# Patient Record
Sex: FEMALE | Race: ASIAN | NOT HISPANIC OR LATINO
[De-identification: names, ages, dates, MRNs, and addresses within clinical notes are randomized per-mention and may not be internally consistent; named-entity substitution may affect disease eponyms.]

---

## 2019-04-20 PROBLEM — Z00.00 ENCOUNTER FOR PREVENTIVE HEALTH EXAMINATION: Status: ACTIVE | Noted: 2019-04-20

## 2019-04-26 ENCOUNTER — ASOB RESULT (OUTPATIENT)
Age: 29
End: 2019-04-26

## 2019-04-26 ENCOUNTER — APPOINTMENT (OUTPATIENT)
Dept: ANTEPARTUM | Facility: CLINIC | Age: 29
End: 2019-04-26
Payer: COMMERCIAL

## 2019-04-26 PROCEDURE — 76801 OB US < 14 WKS SINGLE FETUS: CPT

## 2019-04-26 PROCEDURE — 76813 OB US NUCHAL MEAS 1 GEST: CPT

## 2019-04-26 PROCEDURE — 36416 COLLJ CAPILLARY BLOOD SPEC: CPT

## 2019-05-10 ENCOUNTER — TRANSCRIPTION ENCOUNTER (OUTPATIENT)
Age: 29
End: 2019-05-10

## 2019-06-13 ENCOUNTER — APPOINTMENT (OUTPATIENT)
Dept: ANTEPARTUM | Facility: CLINIC | Age: 29
End: 2019-06-13
Payer: COMMERCIAL

## 2019-06-13 PROCEDURE — 76811 OB US DETAILED SNGL FETUS: CPT

## 2019-06-13 PROCEDURE — 76817 TRANSVAGINAL US OBSTETRIC: CPT

## 2019-07-22 ENCOUNTER — NON-APPOINTMENT (OUTPATIENT)
Age: 29
End: 2019-07-22

## 2019-07-22 ENCOUNTER — APPOINTMENT (OUTPATIENT)
Dept: OBGYN | Facility: CLINIC | Age: 29
End: 2019-07-22
Payer: COMMERCIAL

## 2019-07-22 VITALS
WEIGHT: 154 LBS | SYSTOLIC BLOOD PRESSURE: 112 MMHG | HEIGHT: 64 IN | BODY MASS INDEX: 26.29 KG/M2 | DIASTOLIC BLOOD PRESSURE: 71 MMHG

## 2019-07-22 PROCEDURE — 0502F SUBSEQUENT PRENATAL CARE: CPT

## 2019-08-13 ENCOUNTER — APPOINTMENT (OUTPATIENT)
Dept: OBGYN | Facility: CLINIC | Age: 29
End: 2019-08-13
Payer: COMMERCIAL

## 2019-08-13 ENCOUNTER — NON-APPOINTMENT (OUTPATIENT)
Age: 29
End: 2019-08-13

## 2019-08-13 VITALS
BODY MASS INDEX: 26.8 KG/M2 | OXYGEN SATURATION: 98 % | DIASTOLIC BLOOD PRESSURE: 78 MMHG | SYSTOLIC BLOOD PRESSURE: 118 MMHG | WEIGHT: 157 LBS | HEIGHT: 64 IN | HEART RATE: 91 BPM

## 2019-08-13 PROCEDURE — 99213 OFFICE O/P EST LOW 20 MIN: CPT

## 2019-08-14 LAB
BASOPHILS # BLD AUTO: 0.04 K/UL
BASOPHILS NFR BLD AUTO: 0.3 %
BLD GP AB SCN SERPL QL: NORMAL
EOSINOPHIL # BLD AUTO: 0.08 K/UL
EOSINOPHIL NFR BLD AUTO: 0.7 %
GLUCOSE 1H P 50 G GLC PO SERPL-MCNC: 112 MG/DL
HBV SURFACE AG SER QL: NONREACTIVE
HCT VFR BLD CALC: 33 %
HGB BLD-MCNC: 10 G/DL
IMM GRANULOCYTES NFR BLD AUTO: 1.1 %
LYMPHOCYTES # BLD AUTO: 2.08 K/UL
LYMPHOCYTES NFR BLD AUTO: 17.4 %
MAN DIFF?: NORMAL
MCHC RBC-ENTMCNC: 27.5 PG
MCHC RBC-ENTMCNC: 30.3 GM/DL
MCV RBC AUTO: 90.9 FL
MONOCYTES # BLD AUTO: 0.72 K/UL
MONOCYTES NFR BLD AUTO: 6 %
NEUTROPHILS # BLD AUTO: 8.91 K/UL
NEUTROPHILS NFR BLD AUTO: 74.5 %
PLATELET # BLD AUTO: 332 K/UL
RBC # BLD: 3.63 M/UL
RBC # FLD: 13.4 %
WBC # FLD AUTO: 11.96 K/UL

## 2019-08-15 LAB — LEAD BLD-MCNC: <1 UG/DL

## 2019-08-27 ENCOUNTER — APPOINTMENT (OUTPATIENT)
Dept: OBGYN | Facility: CLINIC | Age: 29
End: 2019-08-27

## 2019-08-28 ENCOUNTER — APPOINTMENT (OUTPATIENT)
Dept: OBGYN | Facility: CLINIC | Age: 29
End: 2019-08-28
Payer: COMMERCIAL

## 2019-08-28 ENCOUNTER — NON-APPOINTMENT (OUTPATIENT)
Age: 29
End: 2019-08-28

## 2019-08-28 VITALS
WEIGHT: 159 LBS | BODY MASS INDEX: 27.14 KG/M2 | SYSTOLIC BLOOD PRESSURE: 125 MMHG | DIASTOLIC BLOOD PRESSURE: 79 MMHG | HEIGHT: 64 IN

## 2019-08-28 PROCEDURE — 0502F SUBSEQUENT PRENATAL CARE: CPT

## 2019-09-10 ENCOUNTER — APPOINTMENT (OUTPATIENT)
Dept: ANTEPARTUM | Facility: CLINIC | Age: 29
End: 2019-09-10
Payer: COMMERCIAL

## 2019-09-10 ENCOUNTER — ASOB RESULT (OUTPATIENT)
Age: 29
End: 2019-09-10

## 2019-09-10 ENCOUNTER — APPOINTMENT (OUTPATIENT)
Dept: OBGYN | Facility: CLINIC | Age: 29
End: 2019-09-10
Payer: COMMERCIAL

## 2019-09-10 VITALS
SYSTOLIC BLOOD PRESSURE: 110 MMHG | HEIGHT: 64 IN | DIASTOLIC BLOOD PRESSURE: 74 MMHG | WEIGHT: 157 LBS | BODY MASS INDEX: 26.8 KG/M2

## 2019-09-10 PROCEDURE — 76819 FETAL BIOPHYS PROFIL W/O NST: CPT

## 2019-09-10 PROCEDURE — 0502F SUBSEQUENT PRENATAL CARE: CPT

## 2019-09-10 PROCEDURE — 76811 OB US DETAILED SNGL FETUS: CPT

## 2019-09-25 ENCOUNTER — NON-APPOINTMENT (OUTPATIENT)
Age: 29
End: 2019-09-25

## 2019-09-25 ENCOUNTER — APPOINTMENT (OUTPATIENT)
Dept: OBGYN | Facility: CLINIC | Age: 29
End: 2019-09-25
Payer: COMMERCIAL

## 2019-09-25 VITALS
BODY MASS INDEX: 27.31 KG/M2 | SYSTOLIC BLOOD PRESSURE: 115 MMHG | DIASTOLIC BLOOD PRESSURE: 74 MMHG | HEIGHT: 64 IN | WEIGHT: 160 LBS

## 2019-09-25 PROCEDURE — 0502F SUBSEQUENT PRENATAL CARE: CPT

## 2019-10-02 ENCOUNTER — APPOINTMENT (OUTPATIENT)
Dept: OBGYN | Facility: CLINIC | Age: 29
End: 2019-10-02
Payer: COMMERCIAL

## 2019-10-02 ENCOUNTER — NON-APPOINTMENT (OUTPATIENT)
Age: 29
End: 2019-10-02

## 2019-10-02 VITALS
DIASTOLIC BLOOD PRESSURE: 76 MMHG | HEIGHT: 64 IN | SYSTOLIC BLOOD PRESSURE: 118 MMHG | WEIGHT: 160 LBS | BODY MASS INDEX: 27.31 KG/M2

## 2019-10-02 PROCEDURE — 0502F SUBSEQUENT PRENATAL CARE: CPT

## 2019-10-03 LAB
C TRACH RRNA SPEC QL NAA+PROBE: NOT DETECTED
N GONORRHOEA RRNA SPEC QL NAA+PROBE: NOT DETECTED
SOURCE AMPLIFICATION: NORMAL

## 2019-10-07 LAB — B-HEM STREP SPEC QL CULT: ABNORMAL

## 2019-10-09 ENCOUNTER — APPOINTMENT (OUTPATIENT)
Dept: OBGYN | Facility: CLINIC | Age: 29
End: 2019-10-09
Payer: COMMERCIAL

## 2019-10-09 ENCOUNTER — NON-APPOINTMENT (OUTPATIENT)
Age: 29
End: 2019-10-09

## 2019-10-09 VITALS
BODY MASS INDEX: 27.66 KG/M2 | WEIGHT: 162 LBS | HEIGHT: 64 IN | DIASTOLIC BLOOD PRESSURE: 80 MMHG | SYSTOLIC BLOOD PRESSURE: 112 MMHG

## 2019-10-09 PROCEDURE — 0502F SUBSEQUENT PRENATAL CARE: CPT

## 2019-10-16 ENCOUNTER — NON-APPOINTMENT (OUTPATIENT)
Age: 29
End: 2019-10-16

## 2019-10-16 ENCOUNTER — APPOINTMENT (OUTPATIENT)
Dept: OBGYN | Facility: CLINIC | Age: 29
End: 2019-10-16
Payer: COMMERCIAL

## 2019-10-16 VITALS
SYSTOLIC BLOOD PRESSURE: 120 MMHG | WEIGHT: 163 LBS | HEIGHT: 64 IN | DIASTOLIC BLOOD PRESSURE: 76 MMHG | BODY MASS INDEX: 27.83 KG/M2

## 2019-10-16 PROCEDURE — 0502F SUBSEQUENT PRENATAL CARE: CPT

## 2019-10-23 ENCOUNTER — APPOINTMENT (OUTPATIENT)
Dept: OBGYN | Facility: CLINIC | Age: 29
End: 2019-10-23
Payer: COMMERCIAL

## 2019-10-23 ENCOUNTER — NON-APPOINTMENT (OUTPATIENT)
Age: 29
End: 2019-10-23

## 2019-10-23 VITALS
SYSTOLIC BLOOD PRESSURE: 121 MMHG | WEIGHT: 164 LBS | DIASTOLIC BLOOD PRESSURE: 79 MMHG | HEIGHT: 64 IN | BODY MASS INDEX: 28 KG/M2

## 2019-10-23 PROCEDURE — 0502F SUBSEQUENT PRENATAL CARE: CPT

## 2019-10-25 ENCOUNTER — TRANSCRIPTION ENCOUNTER (OUTPATIENT)
Age: 29
End: 2019-10-25

## 2019-10-25 ENCOUNTER — INPATIENT (INPATIENT)
Facility: HOSPITAL | Age: 29
LOS: 3 days | Discharge: HOME | End: 2019-10-29
Attending: OBSTETRICS & GYNECOLOGY | Admitting: OBSTETRICS & GYNECOLOGY
Payer: COMMERCIAL

## 2019-10-25 ENCOUNTER — OUTPATIENT (OUTPATIENT)
Dept: OUTPATIENT SERVICES | Facility: HOSPITAL | Age: 29
LOS: 1 days | Discharge: HOME | End: 2019-10-25
Payer: COMMERCIAL

## 2019-10-25 ENCOUNTER — OUTPATIENT (OUTPATIENT)
Dept: OUTPATIENT SERVICES | Facility: HOSPITAL | Age: 29
LOS: 1 days | Discharge: HOME | End: 2019-10-25

## 2019-10-25 VITALS
TEMPERATURE: 99 F | HEART RATE: 92 BPM | OXYGEN SATURATION: 100 % | SYSTOLIC BLOOD PRESSURE: 134 MMHG | RESPIRATION RATE: 16 BRPM | DIASTOLIC BLOOD PRESSURE: 78 MMHG

## 2019-10-25 VITALS
HEART RATE: 93 BPM | RESPIRATION RATE: 18 BRPM | TEMPERATURE: 98 F | SYSTOLIC BLOOD PRESSURE: 125 MMHG | DIASTOLIC BLOOD PRESSURE: 79 MMHG

## 2019-10-25 VITALS — HEART RATE: 83 BPM | DIASTOLIC BLOOD PRESSURE: 82 MMHG | SYSTOLIC BLOOD PRESSURE: 129 MMHG

## 2019-10-25 VITALS — DIASTOLIC BLOOD PRESSURE: 77 MMHG | HEART RATE: 83 BPM | SYSTOLIC BLOOD PRESSURE: 130 MMHG | TEMPERATURE: 98 F

## 2019-10-25 VITALS
DIASTOLIC BLOOD PRESSURE: 80 MMHG | SYSTOLIC BLOOD PRESSURE: 119 MMHG | RESPIRATION RATE: 16 BRPM | HEART RATE: 106 BPM | TEMPERATURE: 99 F | OXYGEN SATURATION: 98 %

## 2019-10-25 DIAGNOSIS — Z3A.00 WEEKS OF GESTATION OF PREGNANCY NOT SPECIFIED: ICD-10-CM

## 2019-10-25 DIAGNOSIS — O03.9 COMPLETE OR UNSPECIFIED SPONTANEOUS ABORTION WITHOUT COMPLICATION: Chronic | ICD-10-CM

## 2019-10-25 DIAGNOSIS — O26.899 OTHER SPECIFIED PREGNANCY RELATED CONDITIONS, UNSPECIFIED TRIMESTER: ICD-10-CM

## 2019-10-25 DIAGNOSIS — Z98.890 OTHER SPECIFIED POSTPROCEDURAL STATES: Chronic | ICD-10-CM

## 2019-10-25 PROCEDURE — 59025 FETAL NON-STRESS TEST: CPT | Mod: 26

## 2019-10-25 PROCEDURE — 99204 OFFICE O/P NEW MOD 45 MIN: CPT

## 2019-10-25 NOTE — OB PROVIDER TRIAGE NOTE - HISTORY OF PRESENT ILLNESS
30yo  @ 39.2 wks SLIUP uncomp PNC here co ctx's Q3-5 min since 3:30am with scant brownish discharge. Pt is intact with GFM.    Pmhx-denies  Pshx/Hosp-dvc  Meds-PNV  Allergies-PCN->rash  Past ob-TOP 2017 w/ DVC  Gyn-denies

## 2019-10-25 NOTE — OB PROVIDER TRIAGE NOTE - NSOBPROVIDERNOTE_OBGYN_ALL_OB_FT
Dr. Abdul's pt. is a 28y/o EGA 39.2wks . Pt. returns to triage with increase pain with contractions. Pt. denies LOF and VB. Pt. reports FM.    AP: Denies  Medical Hx: Denies  Surgical Hx: D&C 2017  OBGYN Hx: TOPx1 2017 with D&C  Meds: PNV, Zytrec PRN for allergies  Allergies: PCN- Hives    Assessment/Plan:  SVE:/-3  NST in progress Dr. Abdul's pt. is a 30y/o EGA 39.2wks . Pt. returns to triage with increase pain with contractions. Pt. denies LOF and VB. Pt. reports FM.    AP: Denies  Medical Hx: Denies  Surgical Hx: D&C 2017  OBGYN Hx: TOPx1 2017 with D&C  Meds: PNV, Zytrec PRN for allergies  Allergies: PCN- Hives    Assessment/Plan:  SVE:/-3  FHR:130bpm, moderate variability, accels noted, no decels  Cindy every 4mins     No evidence of labor at this time. Discussed findings with Dr. Abdul. Pt. d/c'd home. Pt. to follow up with next ob appointment. Pt. instructed to return to triage with increase abdominal contractions, LOF, VB, or decrease FM.

## 2019-10-25 NOTE — OB PROVIDER TRIAGE NOTE - NSOBPROVIDERNOTE_OBGYN_ALL_OB_FT
30yo  @ 39.2 wks SLIUP uncomp PNC here in early labor 30yo  @ 39.2 wks SLIUP uncomp PNC here in early labor  -NST cat I with accels and mod variability; ctx's Q3 min  -pt was dw Dr Abdul. Pt was dc home with instructions

## 2019-10-25 NOTE — OB PROVIDER TRIAGE NOTE - HISTORY OF PRESENT ILLNESS
Dr. Abdul's pt. is a 28y/o EGA 39.2wks . Pt. returns to triage with increase pain with contractions. Pt. denies LOF and VB. Pt. reports FM.

## 2019-10-26 ENCOUNTER — RESULT REVIEW (OUTPATIENT)
Age: 29
End: 2019-10-26

## 2019-10-26 LAB
BASOPHILS # BLD AUTO: 0.05 K/UL — SIGNIFICANT CHANGE UP (ref 0–0.2)
BASOPHILS NFR BLD AUTO: 0.3 % — SIGNIFICANT CHANGE UP (ref 0–2)
BLD GP AB SCN SERPL QL: NEGATIVE — SIGNIFICANT CHANGE UP
EOSINOPHIL # BLD AUTO: 0.01 K/UL — SIGNIFICANT CHANGE UP (ref 0–0.5)
EOSINOPHIL NFR BLD AUTO: 0.1 % — SIGNIFICANT CHANGE UP (ref 0–6)
HCT VFR BLD CALC: 33.8 % — LOW (ref 34.5–45)
HGB BLD-MCNC: 10.8 G/DL — LOW (ref 11.5–15.5)
IMM GRANULOCYTES NFR BLD AUTO: 0.5 % — SIGNIFICANT CHANGE UP (ref 0–1.5)
LYMPHOCYTES # BLD AUTO: 14.1 % — SIGNIFICANT CHANGE UP (ref 13–44)
LYMPHOCYTES # BLD AUTO: 2.15 K/UL — SIGNIFICANT CHANGE UP (ref 1–3.3)
MCHC RBC-ENTMCNC: 25.6 PG — LOW (ref 27–34)
MCHC RBC-ENTMCNC: 32 % — SIGNIFICANT CHANGE UP (ref 32–36)
MCV RBC AUTO: 80.1 FL — SIGNIFICANT CHANGE UP (ref 80–100)
MONOCYTES # BLD AUTO: 1.2 K/UL — HIGH (ref 0–0.9)
MONOCYTES NFR BLD AUTO: 7.9 % — SIGNIFICANT CHANGE UP (ref 2–14)
NEUTROPHILS # BLD AUTO: 11.73 K/UL — HIGH (ref 1.8–7.4)
NEUTROPHILS NFR BLD AUTO: 77.1 % — HIGH (ref 43–77)
NRBC # FLD: 0 K/UL — SIGNIFICANT CHANGE UP (ref 0–0)
PLATELET # BLD AUTO: 338 K/UL — SIGNIFICANT CHANGE UP (ref 150–400)
PMV BLD: 9.7 FL — SIGNIFICANT CHANGE UP (ref 7–13)
RBC # BLD: 4.22 M/UL — SIGNIFICANT CHANGE UP (ref 3.8–5.2)
RBC # FLD: 14.9 % — HIGH (ref 10.3–14.5)
RH IG SCN BLD-IMP: POSITIVE — SIGNIFICANT CHANGE UP
RH IG SCN BLD-IMP: POSITIVE — SIGNIFICANT CHANGE UP
T PALLIDUM AB TITR SER: NEGATIVE — SIGNIFICANT CHANGE UP
WBC # BLD: 15.22 K/UL — HIGH (ref 3.8–10.5)
WBC # FLD AUTO: 15.22 K/UL — HIGH (ref 3.8–10.5)

## 2019-10-26 PROCEDURE — 88307 TISSUE EXAM BY PATHOLOGIST: CPT | Mod: 26

## 2019-10-26 PROCEDURE — 59514 CESAREAN DELIVERY ONLY: CPT

## 2019-10-26 RX ORDER — ONDANSETRON 8 MG/1
4 TABLET, FILM COATED ORAL ONCE
Refills: 0 | Status: DISCONTINUED | OUTPATIENT
Start: 2019-10-26 | End: 2019-10-26

## 2019-10-26 RX ORDER — VANCOMYCIN HCL 1 G
VIAL (EA) INTRAVENOUS
Refills: 0 | Status: DISCONTINUED | OUTPATIENT
Start: 2019-10-26 | End: 2019-10-26

## 2019-10-26 RX ORDER — VANCOMYCIN HCL 1 G
1000 VIAL (EA) INTRAVENOUS EVERY 12 HOURS
Refills: 0 | Status: DISCONTINUED | OUTPATIENT
Start: 2019-10-26 | End: 2019-10-26

## 2019-10-26 RX ORDER — OXYTOCIN 10 UNIT/ML
VIAL (ML) INJECTION
Qty: 20 | Refills: 0 | Status: DISCONTINUED | OUTPATIENT
Start: 2019-10-26 | End: 2019-10-26

## 2019-10-26 RX ORDER — MAGNESIUM HYDROXIDE 400 MG/1
30 TABLET, CHEWABLE ORAL
Refills: 0 | Status: DISCONTINUED | OUTPATIENT
Start: 2019-10-26 | End: 2019-10-29

## 2019-10-26 RX ORDER — SODIUM CHLORIDE 9 MG/ML
500 INJECTION INTRAMUSCULAR; INTRAVENOUS; SUBCUTANEOUS ONCE
Refills: 0 | Status: DISCONTINUED | OUTPATIENT
Start: 2019-10-26 | End: 2019-10-26

## 2019-10-26 RX ORDER — LANOLIN
1 OINTMENT (GRAM) TOPICAL EVERY 6 HOURS
Refills: 0 | Status: DISCONTINUED | OUTPATIENT
Start: 2019-10-26 | End: 2019-10-29

## 2019-10-26 RX ORDER — SODIUM CHLORIDE 9 MG/ML
1000 INJECTION, SOLUTION INTRAVENOUS
Refills: 0 | Status: DISCONTINUED | OUTPATIENT
Start: 2019-10-26 | End: 2019-10-26

## 2019-10-26 RX ORDER — HYDROMORPHONE HYDROCHLORIDE 2 MG/ML
0.5 INJECTION INTRAMUSCULAR; INTRAVENOUS; SUBCUTANEOUS
Refills: 0 | Status: DISCONTINUED | OUTPATIENT
Start: 2019-10-26 | End: 2019-10-27

## 2019-10-26 RX ORDER — OXYCODONE HYDROCHLORIDE 5 MG/1
5 TABLET ORAL
Refills: 0 | Status: DISCONTINUED | OUTPATIENT
Start: 2019-10-26 | End: 2019-10-29

## 2019-10-26 RX ORDER — TETANUS TOXOID, REDUCED DIPHTHERIA TOXOID AND ACELLULAR PERTUSSIS VACCINE, ADSORBED 5; 2.5; 8; 8; 2.5 [IU]/.5ML; [IU]/.5ML; UG/.5ML; UG/.5ML; UG/.5ML
0.5 SUSPENSION INTRAMUSCULAR ONCE
Refills: 0 | Status: DISCONTINUED | OUTPATIENT
Start: 2019-10-26 | End: 2019-10-29

## 2019-10-26 RX ORDER — OXYTOCIN 10 UNIT/ML
333.33 VIAL (ML) INJECTION
Qty: 20 | Refills: 0 | Status: DISCONTINUED | OUTPATIENT
Start: 2019-10-26 | End: 2019-10-26

## 2019-10-26 RX ORDER — NALOXONE HYDROCHLORIDE 4 MG/.1ML
0.1 SPRAY NASAL
Refills: 0 | Status: DISCONTINUED | OUTPATIENT
Start: 2019-10-26 | End: 2019-10-27

## 2019-10-26 RX ORDER — OXYCODONE HYDROCHLORIDE 5 MG/1
10 TABLET ORAL
Refills: 0 | Status: DISCONTINUED | OUTPATIENT
Start: 2019-10-26 | End: 2019-10-27

## 2019-10-26 RX ORDER — GLYCERIN ADULT
1 SUPPOSITORY, RECTAL RECTAL AT BEDTIME
Refills: 0 | Status: DISCONTINUED | OUTPATIENT
Start: 2019-10-26 | End: 2019-10-29

## 2019-10-26 RX ORDER — BUTORPHANOL TARTRATE 2 MG/ML
0.12 INJECTION, SOLUTION INTRAMUSCULAR; INTRAVENOUS EVERY 6 HOURS
Refills: 0 | Status: DISCONTINUED | OUTPATIENT
Start: 2019-10-26 | End: 2019-10-27

## 2019-10-26 RX ORDER — ACETAMINOPHEN 500 MG
975 TABLET ORAL
Refills: 0 | Status: DISCONTINUED | OUTPATIENT
Start: 2019-10-26 | End: 2019-10-29

## 2019-10-26 RX ORDER — VANCOMYCIN HCL 1 G
1000 VIAL (EA) INTRAVENOUS ONCE
Refills: 0 | Status: COMPLETED | OUTPATIENT
Start: 2019-10-26 | End: 2019-10-26

## 2019-10-26 RX ORDER — DIPHENHYDRAMINE HCL 50 MG
25 CAPSULE ORAL EVERY 6 HOURS
Refills: 0 | Status: DISCONTINUED | OUTPATIENT
Start: 2019-10-26 | End: 2019-10-29

## 2019-10-26 RX ORDER — OXYCODONE HYDROCHLORIDE 5 MG/1
5 TABLET ORAL ONCE
Refills: 0 | Status: DISCONTINUED | OUTPATIENT
Start: 2019-10-26 | End: 2019-10-29

## 2019-10-26 RX ORDER — OXYCODONE HYDROCHLORIDE 5 MG/1
5 TABLET ORAL
Refills: 0 | Status: DISCONTINUED | OUTPATIENT
Start: 2019-10-26 | End: 2019-10-27

## 2019-10-26 RX ORDER — HYDROMORPHONE HYDROCHLORIDE 2 MG/ML
0.5 INJECTION INTRAMUSCULAR; INTRAVENOUS; SUBCUTANEOUS
Refills: 0 | Status: DISCONTINUED | OUTPATIENT
Start: 2019-10-26 | End: 2019-10-26

## 2019-10-26 RX ORDER — ONDANSETRON 8 MG/1
4 TABLET, FILM COATED ORAL EVERY 6 HOURS
Refills: 0 | Status: DISCONTINUED | OUTPATIENT
Start: 2019-10-26 | End: 2019-10-27

## 2019-10-26 RX ORDER — SIMETHICONE 80 MG/1
80 TABLET, CHEWABLE ORAL EVERY 4 HOURS
Refills: 0 | Status: DISCONTINUED | OUTPATIENT
Start: 2019-10-26 | End: 2019-10-29

## 2019-10-26 RX ORDER — SODIUM CHLORIDE 9 MG/ML
1000 INJECTION INTRAMUSCULAR; INTRAVENOUS; SUBCUTANEOUS
Refills: 0 | Status: DISCONTINUED | OUTPATIENT
Start: 2019-10-26 | End: 2019-10-26

## 2019-10-26 RX ORDER — OXYTOCIN 10 UNIT/ML
2 VIAL (ML) INJECTION
Qty: 30 | Refills: 0 | Status: DISCONTINUED | OUTPATIENT
Start: 2019-10-26 | End: 2019-10-26

## 2019-10-26 RX ORDER — KETOROLAC TROMETHAMINE 30 MG/ML
30 SYRINGE (ML) INJECTION EVERY 6 HOURS
Refills: 0 | Status: DISCONTINUED | OUTPATIENT
Start: 2019-10-26 | End: 2019-10-27

## 2019-10-26 RX ORDER — IBUPROFEN 200 MG
600 TABLET ORAL EVERY 6 HOURS
Refills: 0 | Status: COMPLETED | OUTPATIENT
Start: 2019-10-26 | End: 2020-09-23

## 2019-10-26 RX ORDER — HEPARIN SODIUM 5000 [USP'U]/ML
5000 INJECTION INTRAVENOUS; SUBCUTANEOUS EVERY 12 HOURS
Refills: 0 | Status: DISCONTINUED | OUTPATIENT
Start: 2019-10-26 | End: 2019-10-29

## 2019-10-26 RX ADMIN — Medication 250 MILLIGRAM(S): at 02:07

## 2019-10-26 RX ADMIN — Medication 30 MILLIGRAM(S): at 21:17

## 2019-10-26 RX ADMIN — Medication 2 MILLIUNIT(S)/MIN: at 07:42

## 2019-10-26 RX ADMIN — ONDANSETRON 4 MILLIGRAM(S): 8 TABLET, FILM COATED ORAL at 14:51

## 2019-10-26 RX ADMIN — Medication 30 MILLIGRAM(S): at 16:00

## 2019-10-26 RX ADMIN — Medication 30 MILLIGRAM(S): at 22:20

## 2019-10-26 RX ADMIN — SODIUM CHLORIDE 125 MILLILITER(S): 9 INJECTION, SOLUTION INTRAVENOUS at 02:07

## 2019-10-26 RX ADMIN — Medication 30 MILLIGRAM(S): at 15:30

## 2019-10-26 RX ADMIN — HEPARIN SODIUM 5000 UNIT(S): 5000 INJECTION INTRAVENOUS; SUBCUTANEOUS at 18:00

## 2019-10-26 NOTE — OB PROVIDER DELIVERY SUMMARY - NSPROVIDERDELIVERYNOTE_OBGYN_ALL_OB_FT
29yrs old  ega 39weeks has primary  section a live female child with apgar scores.paediatrics present 29yrs old  ega 39weeks has primary  section a live female child with apgar scores8&9 .pediatrics present at the time of delivery of baby. small fibroid present on anterior wall of uterus .tubes& ovaries wnl. estimated blood loss 800ml.complications none. weight of baby [id number].iv fluids2 liters .urine [id number]

## 2019-10-26 NOTE — OB PROVIDER H&P - HISTORY OF PRESENT ILLNESS
Dr. Abdul's pt. is a 30y/o EGA 39.3wks . Pt. returns to triage with leakage of green tinged fluid around 1030pm and irregular contractions. Pt. denies VB. Pt. reports FM.

## 2019-10-26 NOTE — CHART NOTE - NSCHARTNOTEFT_GEN_A_CORE
R1 Labor Note: Pt evaluated at bedside for cat 2 tracing    VS  T(C): 36.8 (10-26-19 @ 04:04)  HR: 92 (10-26-19 @ 05:07)  BP: 116/77 (10-26-19 @ 05:07)  RR: 12 (10-26-19 @ 02:49)  SpO2: 100% (10-26-19 @ 05:08)    SVE: 4/70/0  FHT: 140 bpm, mod seth, -acel, +decel to 120's  Grill: irregular    Plan:  FHT Cat 2 overall reassuring  Expt mgmt  Resuscitation measures initiated including O2, repositioning  Consider C/S if tracing remains cat 2    to be d/w Chantell Mcnamara PGY1 R1 Labor Note: Pt evaluated at bedside for cat 2 tracing    VS  T(C): 36.8 (10-26-19 @ 04:04)  HR: 92 (10-26-19 @ 05:07)  BP: 116/77 (10-26-19 @ 05:07)  RR: 12 (10-26-19 @ 02:49)  SpO2: 100% (10-26-19 @ 05:08)    SVE: 4/70/0  FHT: 140 bpm, mod seth, -acel, +decel to 120's  Diablock: irregular    Plan:  FHT Cat 2 overall reassuring  Resuscitation measures initiated including O2, repositioning  IUPC placed without incident. Amnioinfusion 500cc bolus then run at 125  Consider C/S if tracing remains cat 2    to be d/w Chantell Mcnamara PGY1

## 2019-10-26 NOTE — OB PROVIDER TRIAGE NOTE - HISTORY OF PRESENT ILLNESS
Dr. Abdul's pt. is a 28y/o EGA 39.3wks . Pt. returns to triage with leakage of green tinged fluid around 1030pm and irregular contractions. Pt. denies VB. Pt. reports FM.

## 2019-10-26 NOTE — BRIEF OPERATIVE NOTE - NSICDXBRIEFPREOP_GEN_ALL_CORE_FT
PRE-OP DIAGNOSIS:  Non-reassuring fetal heart rate or rhythm affecting management of mother 26-Oct-2019 11:55:38  Dora Styles

## 2019-10-26 NOTE — OB PROVIDER H&P - ATTENDING COMMENTS
29yrs old  ega [id number] came with spontaneous rupture of membranes early in labor  vitals stable,afebrile,bp110/72/minute  per abdomen soft,bs present,uterus martinez every 3-4 minutes foe 40sec  fetal heart [id number] with catogary 1 tracing  pelvic exam deferred  assessment 39weeks early in labor  plan for pitocin augmentation.risks&benifits are explained to patient.she understands her condition&agreed for pitocin augmentation

## 2019-10-26 NOTE — OB RN DELIVERY SUMMARY - NS_SEPSISRSKCALC_OBGYN_ALL_OB_FT
EOS calculated successfully. EOS Risk Factor: 0.5/1000 live births ([facility] national incidence); GA=39w3d; Temp=99.14; ROM=12.217; GBS='Positive'; Antibiotics='No antibiotics or any antibiotics < 2 hrs prior to birth'

## 2019-10-26 NOTE — OB RN PATIENT PROFILE - EDUCATION PROVIDED ON ASSESSMENT OF INFANT "FEEDING CUES" AND THE IMPORTANCE OF FEEDING "ON CUE" / "BABY-LED" FEEDINGS
Continue same warfarin dosing    Call your physician immediately if you notice any of the following symptoms of a blood clot:   Sudden weakness in any limb  Numbness or tingling anywhere  Visual changes or loss of sight in either eye  Sudden onset of slurred speech or inability to speak  Dizziness or faintness  New pain, swelling, redness or heat in any extremity  New SOB or chest pain     Statement Selected

## 2019-10-26 NOTE — OB PROVIDER TRIAGE NOTE - NSOBPROVIDERNOTE_OBGYN_ALL_OB_FT
Dr. Abdul's pt. is a 30y/o EGA 39.3wks . Pt. returns to triage with leakage of green tinged fluid around 1030pm and irregular contractions. Pt. denies VB. Pt. reports FM.    AP: Denies  Medical Hx: Denies  Surgical Hx: D&C 2017  OBGYN Hx: TOPx1 2017 with D&C  Meds: PNV, Zyrtec prn for allergies  Allergies: PCN-Hives    Assessment/Plan  Speculum: Positive pooling, nitrazine, and fern, thick meconium   SVE: 3/70/-2  TAS:  GBS: Neg. as per pt.  EFW:3000gms by leopolds  FHR: 140bpm, moderate variability, accles noted, early decels   Cindy every 4-5mins     Evidence of rupture of membranes. Discussed findings with Dr. Abdul.   -Admit to L&D  -Vancomycin for GBS  -Induce with PO cytotec

## 2019-10-26 NOTE — OB PROVIDER TRIAGE NOTE - NSHPPHYSICALEXAM_GEN_ALL_CORE
Speculum: Positive pooling, nitrazine, and fern  SVE: 3/70/-2  TAS:  EFW:3000gms by leopolds  FHR: 140bpm, moderate variability, accles noted, early decels   Cindy every 4-5mins Speculum: Positive pooling, nitrazine, and fern  SVE: 3/70/-2  TAS: Cephalic presentation   GBS: Pos. as per pt  EFW:3000gms by leopolds  FHR: 140bpm, moderate variability, accles noted, early decels   Cindy every 4-5mins

## 2019-10-26 NOTE — CHART NOTE - NSCHARTNOTEFT_GEN_A_CORE
baby is tachycardia 180-19-s remote from delivery  pelvic exam cervix 5-6cms dilated, cephalic presentation,-1 station  assessment catogary2 tracing  plan for primary  section .risks& benefits of  section are explained to patient. risks of infection, hemorrhage,possible blood transfusion, possible injury to adjacent organs ,thromboembolism are explained to patient. she understands her condition &agreed for proposed procedure

## 2019-10-26 NOTE — CHART NOTE - NSCHARTNOTEFT_GEN_A_CORE
ob service att note    persistent cat II FHR, despite resuscitation. discussed discontinuing pitocin during safety rounds at 8am. pitocin to be stopped to allow for resuscitation.     Dr. Abdul to talk to patient about recc for  delivery

## 2019-10-26 NOTE — BRIEF OPERATIVE NOTE - NSICDXBRIEFPOSTOP_GEN_ALL_CORE_FT
POST-OP DIAGNOSIS:  Non-reassuring fetal heart rate or rhythm affecting management of mother 26-Oct-2019 11:55:44  Dora Styles

## 2019-10-26 NOTE — OB RN INTRAOPERATIVE NOTE - NS_DISCHARGEDTO_OBGYN_ALL_OB
[FreeTextEntry1] : I, Joann Gould wrote this note acting as a scribe for Dr. Aden Phillips on Apr 09, 2019. 
L&D Pacu

## 2019-10-26 NOTE — BRIEF OPERATIVE NOTE - OPERATION/FINDINGS
Delivery of a viable female infant, apgars 8,9, weight= 6lbs 10oz, cephalic presentation. Thick meconium. Grossly normal uterus, tubes, ovaries. Small fibroid noted on anterior uterus; small extension at right angle of hysterotomy. Hemostatic.

## 2019-10-26 NOTE — OB PROVIDER H&P - NSHPPHYSICALEXAM_GEN_ALL_CORE
Speculum: Positive pooling, nitrazine, and fern  SVE: 3/70/-2  TAS: Cephalic presentation   GBS: Pos. as per pt  EFW:3000gms by leopolds  FHR: 140bpm, moderate variability, accles noted, early decels   Cindy every 4-5mins

## 2019-10-26 NOTE — OB RN PATIENT PROFILE - NS PRO PT RIGHT SUPPORT PERSON
Patient states compliant with regimen. No bleeding or thromboembolic side effects noted. No significant med or dietary changes. No significant recent illness or disease state changes. PT/INR done in office per protocol. INR today is 2.2. Patient understands dosing directions and information discussed. Dosing card given to patient. Progress note faxed to office. INR therapeutic at 2.2.  Goal 2-3  Continue warfarin 5 mg MWF and 2.5 mg 4x weekly  Recheck INR in two weeks Yes

## 2019-10-26 NOTE — CHART NOTE - NSCHARTNOTEFT_GEN_A_CORE
c/o pelvic pressure  vitala stable,afebrile,bp110/72/minute  per abdomen soft,uterus martinez every 3 minutes for 30-40sec,fetal heart rate 140s with catogary 1 tracing  pelvic exam cervix 5 cms dilated, -1 station,80%effaced cephalic presentation,ise placed  assessement 39weeks 3days on pitocin augmentation  plan for continue pitocin augmentation.watch her tracing

## 2019-10-27 ENCOUNTER — TRANSCRIPTION ENCOUNTER (OUTPATIENT)
Age: 29
End: 2019-10-27

## 2019-10-27 LAB
ANISOCYTOSIS BLD QL: SLIGHT — SIGNIFICANT CHANGE UP
BASOPHILS # BLD AUTO: 0.04 K/UL — SIGNIFICANT CHANGE UP (ref 0–0.2)
BASOPHILS NFR BLD AUTO: 0.2 % — SIGNIFICANT CHANGE UP (ref 0–2)
BASOPHILS NFR SPEC: 0 % — SIGNIFICANT CHANGE UP (ref 0–2)
BLASTS # FLD: 0 % — SIGNIFICANT CHANGE UP (ref 0–0)
EOSINOPHIL # BLD AUTO: 0.01 K/UL — SIGNIFICANT CHANGE UP (ref 0–0.5)
EOSINOPHIL NFR BLD AUTO: 0.1 % — SIGNIFICANT CHANGE UP (ref 0–6)
EOSINOPHIL NFR FLD: 0 % — SIGNIFICANT CHANGE UP (ref 0–6)
GIANT PLATELETS BLD QL SMEAR: PRESENT — SIGNIFICANT CHANGE UP
HCT VFR BLD CALC: 29.1 % — LOW (ref 34.5–45)
HGB BLD-MCNC: 9.3 G/DL — LOW (ref 11.5–15.5)
IMM GRANULOCYTES NFR BLD AUTO: 0.7 % — SIGNIFICANT CHANGE UP (ref 0–1.5)
LYMPHOCYTES # BLD AUTO: 14.3 % — SIGNIFICANT CHANGE UP (ref 13–44)
LYMPHOCYTES # BLD AUTO: 2.76 K/UL — SIGNIFICANT CHANGE UP (ref 1–3.3)
LYMPHOCYTES NFR SPEC AUTO: 8.8 % — LOW (ref 13–44)
MCHC RBC-ENTMCNC: 26.1 PG — LOW (ref 27–34)
MCHC RBC-ENTMCNC: 32 % — SIGNIFICANT CHANGE UP (ref 32–36)
MCV RBC AUTO: 81.5 FL — SIGNIFICANT CHANGE UP (ref 80–100)
METAMYELOCYTES # FLD: 0 % — SIGNIFICANT CHANGE UP (ref 0–1)
MICROCYTES BLD QL: SLIGHT — SIGNIFICANT CHANGE UP
MONOCYTES # BLD AUTO: 1.61 K/UL — HIGH (ref 0–0.9)
MONOCYTES NFR BLD AUTO: 8.3 % — SIGNIFICANT CHANGE UP (ref 2–14)
MONOCYTES NFR BLD: 4.4 % — SIGNIFICANT CHANGE UP (ref 2–9)
MYELOCYTES NFR BLD: 0 % — SIGNIFICANT CHANGE UP (ref 0–0)
NEUTROPHIL AB SER-ACNC: 78.9 % — HIGH (ref 43–77)
NEUTROPHILS # BLD AUTO: 14.77 K/UL — HIGH (ref 1.8–7.4)
NEUTROPHILS NFR BLD AUTO: 76.4 % — SIGNIFICANT CHANGE UP (ref 43–77)
NEUTS BAND # BLD: 2.6 % — SIGNIFICANT CHANGE UP (ref 0–6)
NRBC # FLD: 0 K/UL — SIGNIFICANT CHANGE UP (ref 0–0)
OTHER - HEMATOLOGY %: 0 — SIGNIFICANT CHANGE UP
PLATELET # BLD AUTO: 274 K/UL — SIGNIFICANT CHANGE UP (ref 150–400)
PLATELET COUNT - ESTIMATE: NORMAL — SIGNIFICANT CHANGE UP
PMV BLD: 9.6 FL — SIGNIFICANT CHANGE UP (ref 7–13)
POLYCHROMASIA BLD QL SMEAR: SLIGHT — SIGNIFICANT CHANGE UP
PROMYELOCYTES # FLD: 0 % — SIGNIFICANT CHANGE UP (ref 0–0)
RBC # BLD: 3.57 M/UL — LOW (ref 3.8–5.2)
RBC # FLD: 15.1 % — HIGH (ref 10.3–14.5)
VARIANT LYMPHS # BLD: 5.3 % — SIGNIFICANT CHANGE UP
WBC # BLD: 19.32 K/UL — HIGH (ref 3.8–10.5)
WBC # FLD AUTO: 19.32 K/UL — HIGH (ref 3.8–10.5)

## 2019-10-27 PROCEDURE — 99232 SBSQ HOSP IP/OBS MODERATE 35: CPT

## 2019-10-27 RX ORDER — IBUPROFEN 200 MG
600 TABLET ORAL EVERY 6 HOURS
Refills: 0 | Status: DISCONTINUED | OUTPATIENT
Start: 2019-10-27 | End: 2019-10-29

## 2019-10-27 RX ADMIN — Medication 600 MILLIGRAM(S): at 21:45

## 2019-10-27 RX ADMIN — Medication 30 MILLIGRAM(S): at 09:19

## 2019-10-27 RX ADMIN — Medication 30 MILLIGRAM(S): at 10:00

## 2019-10-27 RX ADMIN — Medication 600 MILLIGRAM(S): at 16:00

## 2019-10-27 RX ADMIN — Medication 600 MILLIGRAM(S): at 15:15

## 2019-10-27 RX ADMIN — Medication 30 MILLIGRAM(S): at 03:19

## 2019-10-27 RX ADMIN — Medication 975 MILLIGRAM(S): at 23:32

## 2019-10-27 RX ADMIN — Medication 600 MILLIGRAM(S): at 20:47

## 2019-10-27 RX ADMIN — Medication 30 MILLIGRAM(S): at 04:03

## 2019-10-27 RX ADMIN — HEPARIN SODIUM 5000 UNIT(S): 5000 INJECTION INTRAVENOUS; SUBCUTANEOUS at 05:48

## 2019-10-27 RX ADMIN — SIMETHICONE 80 MILLIGRAM(S): 80 TABLET, CHEWABLE ORAL at 05:48

## 2019-10-27 NOTE — PROGRESS NOTE ADULT - PROBLEM SELECTOR PLAN 1
- Continue regular diet.  - Increase ambulation.  - Continue motrin, tylenol, oxycodone PRN for pain control.   - Patient asymptomatic with no evidence of ongoing bleeding.    Triny Ramirez, PGY-1

## 2019-10-27 NOTE — PROGRESS NOTE ADULT - SUBJECTIVE AND OBJECTIVE BOX
ANESTHESIA POSTOP CHECK    29y Female POSTOP DAY 1    Vital Signs Last 24 Hrs  T(C): 37.2 (27 Oct 2019 06:23), Max: 37.4 (26 Oct 2019 17:00)  T(F): 98.9 (27 Oct 2019 06:23), Max: 99.4 (26 Oct 2019 17:00)  HR: 80 (27 Oct 2019 06:23) (68 - 110)  BP: 115/73 (27 Oct 2019 06:23) (106/56 - 138/68)  BP(mean): 82 (26 Oct 2019 15:30) (66 - 118)  RR: 16 (27 Oct 2019 06:23) (16 - 28)  SpO2: 98% (27 Oct 2019 06:23) (95% - 100%)  I&O's Summary    26 Oct 2019 07:01  -  27 Oct 2019 07:00  --------------------------------------------------------  IN: 2875 mL / OUT: 2345 mL / NET: 530 mL        [X ] NO APPARENT ANESTHESIA COMPLICATIONS

## 2019-10-27 NOTE — DISCHARGE NOTE OB - MEDICATION SUMMARY - MEDICATIONS TO TAKE
I will START or STAY ON the medications listed below when I get home from the hospital:    Actamin 325 mg oral tablet  -- 3 tab(s) by mouth   -- Indication: For  delivery delivered    Alivio 600 mg oral tablet  -- 1 tab(s) by mouth every 6 hours  -- Indication: For  delivery delivered

## 2019-10-27 NOTE — PROGRESS NOTE ADULT - SUBJECTIVE AND OBJECTIVE BOX
Postpartum Note,  Section  She is a  29y woman who is now post-operative day: 1  HPI:  Dr. Abdul's pt. is a 28y/o EGA 39.3wks . Pt. returns to triage with leakage of green tinged fluid around 1030pm and irregular contractions. Pt. denies VB. Pt. reports FM. (26 Oct 2019 00:54)    Subjective:  The patient feels well.  She is ambulating.   She is tolerating regular diet.  She denies nausea and vomiting.    Her pain is controlled.  She reports normal postpartum bleeding.  She is breastfeeding.  She is formula feeding.    Physical exam  Vital Signs Last 24 Hrs  T(C): 37.2 (27 Oct 2019 06:23), Max: 37.4 (26 Oct 2019 17:00)  T(F): 98.9 (27 Oct 2019 06:23), Max: 99.4 (26 Oct 2019 17:00)  HR: 80 (27 Oct 2019 06:23) (78 - 96)  BP: 115/73 (27 Oct 2019 06:23) (106/56 - 138/68)  BP(mean): 82 (26 Oct 2019 15:30) (66 - 118)  RR: 16 (27 Oct 2019 06:23) (16 - 28)  SpO2: 98% (27 Oct 2019 06:23) (95% - 100%)          Gen: NAD  Breast: Soft, nontender, not engorged.  Abdomen: Soft, nontender, no distension , firm uterine fundus at umbilicus.  Incision: Clean, dry, and intact with steri strips,had no bowel movement  Pelvic: Normal lochia noted  Ext: No calf tenderness    LABS:                              9.3    19.32 )-----------( 274      ( 27 Oct 2019 06:18 )             29.1     Rubella status: immune    no known allergies  Assessment and Plan  POD # s/p  section day1  Doing well.  Encourage ambulation

## 2019-10-27 NOTE — PROGRESS NOTE ADULT - SUBJECTIVE AND OBJECTIVE BOX
OB Progress Note:  Delivery, POD#1    S: 28yo POD#1 s/p pLTCS for NRFHT. Her pain is well controlled. She is tolerating a regular diet and passing flatus. Denies N/V. Denies CP/SOB/lightheadedness/dizziness. Endorses light vaginal bleeding, less than one pad per hour. She is ambulating without difficulty. Voiding spontaneously.     O:   Vital Signs Last 24 Hrs  T(C): 37.2 (27 Oct 2019 06:23), Max: 37.4 (26 Oct 2019 17:00)  T(F): 98.9 (27 Oct 2019 06:23), Max: 99.4 (26 Oct 2019 17:00)  HR: 80 (27 Oct 2019 06:23) (65 - 110)  BP: 115/73 (27 Oct 2019 06:23) (100/60 - 138/68)  BP(mean): 82 (26 Oct 2019 15:30) (66 - 118)  RR: 16 (27 Oct 2019 06:23) (16 - 28)  SpO2: 98% (27 Oct 2019 06:23) (91% - 100%)    PE:  General: NAD  Heart: extremities well-perfused  Lungs: breathing comfortably  Abdomen: Mildly distended, appropriately tender, fundus firm, incision c/d/i  Extremities: No erythema, no pitting edema    Labs:  Blood type: B Positive  Rubella IgG: RPR: Negative                          9.3<L>   19.32<H> >-----------< 274    ( 10-27 @ 06:18 )             29.1<L>                        10.8<L>   15.22<H> >-----------< 338    ( 10-26 @ 01:00 )             33.8<L>

## 2019-10-27 NOTE — DISCHARGE NOTE OB - CARE PROVIDER_API CALL
Itzel Abdul)  Obstetrics and Gynecology  [address], [address] [place] [address]  [place] [address]  Phone: [phone]  Fax: [phone]  Follow Up Time:

## 2019-10-27 NOTE — DISCHARGE NOTE OB - PATIENT PORTAL LINK FT
You can access the FollowMyHealth Patient Portal offered by [facility] by registering at the following website: [email or web address]. By joining [employer]’s FollowMyHealth portal, you will also be able to view your health information using other applications (apps) compatible with our system. [Alert] : alert [No Acute Distress] : no acute distress [Normocephalic] : normocephalic [Conjunctivae with no discharge] : conjunctivae with no discharge [PERRL] : PERRL [EOMI Bilateral] : EOMI bilateral [Auricles Well Formed] : auricles well formed [Clear Tympanic membranes with present light reflex and bony landmarks] : clear tympanic membranes with present light reflex and bony landmarks [No Discharge] : no discharge [Nares Patent] : nares patent [Pink Nasal Mucosa] : pink nasal mucosa [Palate Intact] : palate intact [Nonerythematous Oropharynx] : nonerythematous oropharynx [Supple, full passive range of motion] : supple, full passive range of motion [No Palpable Masses] : no palpable masses [Symmetric Chest Rise] : symmetric chest rise [Regular Rate and Rhythm] : regular rate and rhythm [Clear to Ausculatation Bilaterally] : clear to auscultation bilaterally [Normal S1, S2 present] : normal S1, S2 present [No Murmurs] : no murmurs [+2 Femoral Pulses] : +2 femoral pulses [Soft] : soft [NonTender] : non tender [Non Distended] : non distended [Normoactive Bowel Sounds] : normoactive bowel sounds [No Hepatomegaly] : no hepatomegaly [No Splenomegaly] : no splenomegaly [Patent] : patent [No fissures] : no fissures [No Abnormal Lymph Nodes Palpated] : no abnormal lymph nodes palpated [No Gait Asymmetry] : no gait asymmetry [No pain or deformities with palpation of bone, muscles, joints] : no pain or deformities with palpation of bone, muscles, joints [Normal Muscle Tone] : normal muscle tone [Straight] : straight [+2 Patella DTR] : +2 patella DTR [Cranial Nerves Grossly Intact] : cranial nerves grossly intact [No Rash or Lesions] : no rash or lesions [Casey: ____] : Casey [unfilled] [Casey: _____] : Casey [unfilled]

## 2019-10-27 NOTE — DISCHARGE NOTE OB - CARE PLAN
Principal Discharge DX:	 delivery delivered  Goal:	doing good  Assessment and plan of treatment:	f/u at [id number] in 2weeks,regular diet,actvity as tolerated

## 2019-10-28 PROCEDURE — 99232 SBSQ HOSP IP/OBS MODERATE 35: CPT

## 2019-10-28 RX ADMIN — Medication 975 MILLIGRAM(S): at 10:00

## 2019-10-28 RX ADMIN — HEPARIN SODIUM 5000 UNIT(S): 5000 INJECTION INTRAVENOUS; SUBCUTANEOUS at 05:40

## 2019-10-28 RX ADMIN — Medication 975 MILLIGRAM(S): at 23:35

## 2019-10-28 RX ADMIN — Medication 975 MILLIGRAM(S): at 00:30

## 2019-10-28 RX ADMIN — Medication 600 MILLIGRAM(S): at 13:00

## 2019-10-28 RX ADMIN — Medication 975 MILLIGRAM(S): at 15:55

## 2019-10-28 RX ADMIN — Medication 600 MILLIGRAM(S): at 18:32

## 2019-10-28 RX ADMIN — Medication 600 MILLIGRAM(S): at 12:16

## 2019-10-28 RX ADMIN — Medication 975 MILLIGRAM(S): at 23:05

## 2019-10-28 RX ADMIN — Medication 975 MILLIGRAM(S): at 09:07

## 2019-10-28 RX ADMIN — Medication 975 MILLIGRAM(S): at 16:30

## 2019-10-28 RX ADMIN — Medication 600 MILLIGRAM(S): at 06:30

## 2019-10-28 RX ADMIN — Medication 600 MILLIGRAM(S): at 05:40

## 2019-10-28 NOTE — PROGRESS NOTE ADULT - SUBJECTIVE AND OBJECTIVE BOX
Postpartum Note,  Section  She is a  29y woman who is now post-operative day: 2  HPI:  Dr. Abdul's pt. is a 28y/o EGA 39.3wks . Pt. returns to triage with leakage of green tinged fluid around 1030pm and irregular contractions. Pt. denies VB. Pt. reports FM. (26 Oct 2019 00:54)    Subjective:  The patient feels well.  She is ambulating.   She is tolerating regular diet.  She denies nausea and vomiting.    Her pain is controlled.  She reports normal postpartum bleeding.  She is breastfeeding.  She is formula feeding.    Physical exam  Vital Signs Last 24 Hrs  T(C): 37.1 (28 Oct 2019 05:39), Max: 37.1 (28 Oct 2019 05:39)  T(F): 98.8 (28 Oct 2019 05:39), Max: 98.8 (28 Oct 2019 05:39)  HR: 78 (28 Oct 2019 05:39) (78 - 94)  BP: 111/67 (28 Oct 2019 05:39) (101/55 - 131/69)  BP(mean): --  RR: 16 (28 Oct 2019 05:39) (16 - 17)  SpO2: 100% (28 Oct 2019 05:39) (97% - 100%)          Gen: NAD  Breast: Soft, nontender, not engorged.  Abdomen: Soft, nontender, no distension , firm uterine fundus at umbilicus.  Incision: Clean, dry, and intact with steri strips,had her bowel movement  Pelvic: Normal lochia noted  Ext: No calf tenderness    LABS:                              9.3    19.32 )-----------( 274      ( 27 Oct 2019 06:18 )             29.1     Rubella status: immune    no known allergies  Assessment and Plan  POD # s/p  section day2  Doing well.  Encourage ambulation.d/c home tomorrow

## 2019-10-29 VITALS
RESPIRATION RATE: 16 BRPM | OXYGEN SATURATION: 100 % | TEMPERATURE: 98 F | DIASTOLIC BLOOD PRESSURE: 79 MMHG | SYSTOLIC BLOOD PRESSURE: 127 MMHG | HEART RATE: 80 BPM

## 2019-10-29 PROCEDURE — 99238 HOSP IP/OBS DSCHRG MGMT 30/<: CPT

## 2019-10-29 RX ORDER — IBUPROFEN 200 MG
1 TABLET ORAL
Qty: 0 | Refills: 0 | DISCHARGE
Start: 2019-10-29

## 2019-10-29 RX ORDER — CETIRIZINE HYDROCHLORIDE 10 MG/1
0 TABLET ORAL
Qty: 0 | Refills: 0 | DISCHARGE

## 2019-10-29 RX ORDER — ACETAMINOPHEN 500 MG
3 TABLET ORAL
Qty: 0 | Refills: 0 | DISCHARGE
Start: 2019-10-29

## 2019-10-29 RX ADMIN — Medication 975 MILLIGRAM(S): at 06:35

## 2019-10-29 RX ADMIN — Medication 975 MILLIGRAM(S): at 06:06

## 2019-10-29 RX ADMIN — Medication 600 MILLIGRAM(S): at 01:25

## 2019-10-29 RX ADMIN — Medication 600 MILLIGRAM(S): at 01:55

## 2019-10-29 NOTE — PROGRESS NOTE ADULT - SUBJECTIVE AND OBJECTIVE BOX
Postpartum Note,  Section  She is a  29y woman who is now post-operative day: 3  HPI:  Dr. Abdul's pt. is a 28y/o EGA 39.3wks . Pt. returns to triage with leakage of green tinged fluid around 1030pm and irregular contractions. Pt. denies VB. Pt. reports FM. (26 Oct 2019 00:54)    Subjective:  The patient feels well.  She is ambulating.   She is tolerating regular diet.  She denies nausea and vomiting.    Her pain is controlled.  She reports normal postpartum bleeding.  She is breastfeeding.  She is formula feeding.    Physical exam  Vital Signs Last 24 Hrs  T(C): 36.6 (29 Oct 2019 05:06), Max: 36.9 (28 Oct 2019 13:30)  T(F): 97.9 (29 Oct 2019 05:06), Max: 98.4 (28 Oct 2019 13:30)  HR: 56 (29 Oct 2019 05:06) (56 - 78)  BP: 127/79 (29 Oct 2019 05:06) (127/79 - 135/87)  BP(mean): --  RR: 16 (29 Oct 2019 05:06) (16 - 17)  SpO2: 100% (29 Oct 2019 05:06) (99% - 100%)          Gen: NAD  Breast: Soft, nontender, not engorged.  Abdomen: Soft, nontender, no distension , firm uterine fundus at umbilicus.  Incision: Clean, dry, and intact with steri strips,had her bowel movement  Pelvic: Normal lochia noted  Ext: No calf tenderness    LABS:          Rubella status: immune    no known allergies  Assessment and Plan  POD # s/p  section day3  Doing well.  Encourage ambulation.d/c home

## 2019-10-30 ENCOUNTER — APPOINTMENT (OUTPATIENT)
Dept: OBGYN | Facility: CLINIC | Age: 29
End: 2019-10-30

## 2019-11-18 ENCOUNTER — APPOINTMENT (OUTPATIENT)
Dept: OBGYN | Facility: CLINIC | Age: 29
End: 2019-11-18

## 2019-11-18 VITALS
DIASTOLIC BLOOD PRESSURE: 81 MMHG | TEMPERATURE: 98.5 F | OXYGEN SATURATION: 98 % | HEIGHT: 64 IN | SYSTOLIC BLOOD PRESSURE: 122 MMHG | WEIGHT: 150 LBS | BODY MASS INDEX: 25.61 KG/M2 | HEART RATE: 77 BPM

## 2019-11-18 RX ORDER — AZITHROMYCIN 250 MG/1
250 TABLET, FILM COATED ORAL
Qty: 1 | Refills: 0 | Status: DISCONTINUED | COMMUNITY
Start: 2019-09-10 | End: 2019-11-18

## 2019-11-18 RX ORDER — INFLUENZA A VIRUS A/CALIFORNIA/7/2009 X-179A (H1N1) ANTIGEN (FORMALDEHYDE INACTIVATED), INFLUENZA A VIRUS A/HONG KONG/4801/2014 X-263B (H3N2) ANTIGEN (FORMALDEHYDE INACTIVATED), AND INFLUENZA B VIRUS B/BRISBANE/60/2008 ANTIGEN (FORMALDEHYDE INACTIVATED) 60; 60; 60 UG/.5ML; UG/.5ML; UG/.5ML
INJECTION, SUSPENSION INTRAMUSCULAR
Qty: 1 | Refills: 0 | Status: DISCONTINUED | COMMUNITY
Start: 2019-09-10 | End: 2019-11-18

## 2019-11-18 RX ORDER — ACETAMINOPHEN 500 MG/1
500 TABLET ORAL
Qty: 60 | Refills: 0 | Status: COMPLETED | COMMUNITY
Start: 2019-10-29 | End: 2019-11-18

## 2019-11-18 RX ORDER — IBUPROFEN 600 MG/1
600 TABLET, FILM COATED ORAL
Qty: 1 | Refills: 0 | Status: COMPLETED | COMMUNITY
Start: 2019-10-29 | End: 2019-11-18

## 2019-11-18 NOTE — HISTORY OF PRESENT ILLNESS
[Fever] : no fever [Chills] : no chills [Vaginal Bleeding] : no vaginal bleeding [Pelvic Pressure] : no pelvic pressure [Clean/Dry/Intact] : clean, dry and intact [None] : no vaginal bleeding [Normal] : the vagina was normal [Doing Well] : is doing well [Sutures Removed] : sutures were removed

## 2019-11-18 NOTE — CHIEF COMPLAINT
[Post Op Day: ___] : post op day #[unfilled] [Procedure: ___] : status post [unfilled] [Indication: ___] : for [unfilled] DISPLAY PLAN FREE TEXT

## 2019-12-09 ENCOUNTER — NON-APPOINTMENT (OUTPATIENT)
Age: 29
End: 2019-12-09

## 2019-12-09 ENCOUNTER — APPOINTMENT (OUTPATIENT)
Dept: OBGYN | Facility: CLINIC | Age: 29
End: 2019-12-09
Payer: COMMERCIAL

## 2019-12-09 VITALS
OXYGEN SATURATION: 98 % | HEIGHT: 64 IN | SYSTOLIC BLOOD PRESSURE: 123 MMHG | WEIGHT: 150 LBS | TEMPERATURE: 98.3 F | DIASTOLIC BLOOD PRESSURE: 81 MMHG | BODY MASS INDEX: 25.61 KG/M2 | HEART RATE: 93 BPM

## 2019-12-09 DIAGNOSIS — Z34.02 ENCOUNTER FOR SUPERVISION OF NORMAL FIRST PREGNANCY, SECOND TRIMESTER: ICD-10-CM

## 2019-12-09 DIAGNOSIS — O99.013 ANEMIA COMPLICATING PREGNANCY, THIRD TRIMESTER: ICD-10-CM

## 2019-12-09 DIAGNOSIS — Z92.29 PERSONAL HISTORY OF OTHER DRUG THERAPY: ICD-10-CM

## 2019-12-09 DIAGNOSIS — R05 COUGH: ICD-10-CM

## 2019-12-09 PROCEDURE — 0503F POSTPARTUM CARE VISIT: CPT

## 2019-12-09 NOTE — HISTORY OF PRESENT ILLNESS
[Postpartum Follow Up] : postpartum follow up [Complications:___] : no complications [Delivery Date: ___] : on [unfilled] [Last Pap Date: ___] : Last Pap Date: [unfilled] [Primary C/S] : delivered by  section [Female] : Delivery History: baby girl [Wt. ___] : weighing [unfilled] [Rhogam] : Rhogam was not administered [Pertussis Vaccine] : Pertussis vaccine was not administered [Rubella Vaccine] : Rubella vaccine was not administered [BTL] : no tubal ligation [Breastfeeding] : currently nursing [Discharge HCT: ___] : hematocrit level was [unfilled] [Discharge HGB: ___] : hemoglobin level was [unfilled] [Resumed [place]] : has not resumed intercourse [Resumed Menses] : has not resumed her menses [Intended Contraception] : Intended Contraception: [IUD] : intrauterine device [Currently Experiencing] : The patient is currently experiencing symptoms. [Abdominal Pain] : no abdominal pain [Back Pain] : no back pain [Breast Pain] : no breast pain [Chest Pain] : no chest pain [Cracked Nipples] : no cracked nipples [S/Sx PP Depression] : no signs/symptoms of postpartum depression [Episiotomy Site Pain] : no episiotomy site pain [Heavy Bleeding] : no heavy bleeding [Incisional Drainage] : no incisional drainage [Incisional Pain] : no incisional pain [Irregular Bleeding] : no irregular bleeding [Leg Pain] : no leg pain [Shortness of Breath] : no shortness of breath [Suicidal Ideation] : no suicidal ideation [Vaginal Discharge] : no vaginal discharge [Clean/Dry/Intact] : clean, dry and intact [Healed] : healed [Back to Normal] : is still enlarged [None] : no vaginal bleeding [Normal] : the vagina was normal [Doing Well] : is doing well [Examination Of The Breasts] : breasts are normal [No Sign of Infection] : is showing no signs of infection [No [place]] : to avoid sexual intercourse [Sutures Removed] : sutures were not removed [Limited Work] : to work with limitations [Limited ADLs] : to participate in activities of daily living with limitations [Limited Housework] : to do housework with limitations [No Sports] : not to participate in sports

## 2020-01-06 NOTE — OB PROVIDER TRIAGE NOTE - LIVING CHILDREN, OB PROFILE
Medication/Dose:   lisinopril (ZESTRIL) 20 MG tablet     Patient last seen by PCP: 11/4/2019  Next office visit with PCP: 11/11/2020  Last Lab:11/4/2019    Above information requires contacting patient: No    Prescription does not require PDMP check    Sent to provider to approve or deny      
0

## 2020-12-12 ENCOUNTER — EMERGENCY (EMERGENCY)
Facility: HOSPITAL | Age: 30
LOS: 1 days | Discharge: HOME | End: 2020-12-12
Attending: EMERGENCY MEDICINE | Admitting: EMERGENCY MEDICINE
Payer: COMMERCIAL

## 2020-12-12 VITALS
HEIGHT: 63 IN | DIASTOLIC BLOOD PRESSURE: 76 MMHG | TEMPERATURE: 99 F | SYSTOLIC BLOOD PRESSURE: 133 MMHG | HEART RATE: 81 BPM | RESPIRATION RATE: 16 BRPM | OXYGEN SATURATION: 99 %

## 2020-12-12 VITALS
SYSTOLIC BLOOD PRESSURE: 127 MMHG | RESPIRATION RATE: 16 BRPM | OXYGEN SATURATION: 100 % | DIASTOLIC BLOOD PRESSURE: 74 MMHG | TEMPERATURE: 98 F | HEART RATE: 99 BPM

## 2020-12-12 DIAGNOSIS — O03.9 COMPLETE OR UNSPECIFIED SPONTANEOUS ABORTION WITHOUT COMPLICATION: Chronic | ICD-10-CM

## 2020-12-12 DIAGNOSIS — Z98.890 OTHER SPECIFIED POSTPROCEDURAL STATES: Chronic | ICD-10-CM

## 2020-12-12 LAB
ALBUMIN SERPL ELPH-MCNC: 4.5 G/DL — SIGNIFICANT CHANGE UP (ref 3.3–5)
ALP SERPL-CCNC: 82 U/L — SIGNIFICANT CHANGE UP (ref 40–120)
ALT FLD-CCNC: 30 U/L — SIGNIFICANT CHANGE UP (ref 4–33)
ANION GAP SERPL CALC-SCNC: 11 MMOL/L — SIGNIFICANT CHANGE UP (ref 7–14)
APPEARANCE UR: CLEAR — SIGNIFICANT CHANGE UP
AST SERPL-CCNC: 14 U/L — SIGNIFICANT CHANGE UP (ref 4–32)
BASOPHILS # BLD AUTO: 0.04 K/UL — SIGNIFICANT CHANGE UP (ref 0–0.2)
BASOPHILS NFR BLD AUTO: 0.5 % — SIGNIFICANT CHANGE UP (ref 0–2)
BILIRUB SERPL-MCNC: 0.3 MG/DL — SIGNIFICANT CHANGE UP (ref 0.2–1.2)
BILIRUB UR-MCNC: NEGATIVE — SIGNIFICANT CHANGE UP
BLD GP AB SCN SERPL QL: NEGATIVE — SIGNIFICANT CHANGE UP
BUN SERPL-MCNC: 9 MG/DL — SIGNIFICANT CHANGE UP (ref 7–23)
CALCIUM SERPL-MCNC: 9.8 MG/DL — SIGNIFICANT CHANGE UP (ref 8.4–10.5)
CHLORIDE SERPL-SCNC: 103 MMOL/L — SIGNIFICANT CHANGE UP (ref 98–107)
CO2 SERPL-SCNC: 23 MMOL/L — SIGNIFICANT CHANGE UP (ref 22–31)
COLOR SPEC: SIGNIFICANT CHANGE UP
CREAT SERPL-MCNC: 0.6 MG/DL — SIGNIFICANT CHANGE UP (ref 0.5–1.3)
DIFF PNL FLD: NEGATIVE — SIGNIFICANT CHANGE UP
EOSINOPHIL # BLD AUTO: 0.02 K/UL — SIGNIFICANT CHANGE UP (ref 0–0.5)
EOSINOPHIL NFR BLD AUTO: 0.2 % — SIGNIFICANT CHANGE UP (ref 0–6)
GLUCOSE SERPL-MCNC: 92 MG/DL — SIGNIFICANT CHANGE UP (ref 70–99)
GLUCOSE UR QL: NEGATIVE — SIGNIFICANT CHANGE UP
HCG SERPL-ACNC: 4784 MIU/ML — SIGNIFICANT CHANGE UP
HCT VFR BLD CALC: 40.3 % — SIGNIFICANT CHANGE UP (ref 34.5–45)
HGB BLD-MCNC: 13.3 G/DL — SIGNIFICANT CHANGE UP (ref 11.5–15.5)
IANC: 5.5 K/UL — SIGNIFICANT CHANGE UP (ref 1.5–8.5)
IMM GRANULOCYTES NFR BLD AUTO: 0.2 % — SIGNIFICANT CHANGE UP (ref 0–1.5)
KETONES UR-MCNC: NEGATIVE — SIGNIFICANT CHANGE UP
LEUKOCYTE ESTERASE UR-ACNC: NEGATIVE — SIGNIFICANT CHANGE UP
LYMPHOCYTES # BLD AUTO: 2.67 K/UL — SIGNIFICANT CHANGE UP (ref 1–3.3)
LYMPHOCYTES # BLD AUTO: 30.1 % — SIGNIFICANT CHANGE UP (ref 13–44)
MCHC RBC-ENTMCNC: 27.6 PG — SIGNIFICANT CHANGE UP (ref 27–34)
MCHC RBC-ENTMCNC: 33 GM/DL — SIGNIFICANT CHANGE UP (ref 32–36)
MCV RBC AUTO: 83.6 FL — SIGNIFICANT CHANGE UP (ref 80–100)
MONOCYTES # BLD AUTO: 0.61 K/UL — SIGNIFICANT CHANGE UP (ref 0–0.9)
MONOCYTES NFR BLD AUTO: 6.9 % — SIGNIFICANT CHANGE UP (ref 2–14)
NEUTROPHILS # BLD AUTO: 5.5 K/UL — SIGNIFICANT CHANGE UP (ref 1.8–7.4)
NEUTROPHILS NFR BLD AUTO: 62.1 % — SIGNIFICANT CHANGE UP (ref 43–77)
NITRITE UR-MCNC: NEGATIVE — SIGNIFICANT CHANGE UP
NRBC # BLD: 0 /100 WBCS — SIGNIFICANT CHANGE UP
NRBC # FLD: 0 K/UL — SIGNIFICANT CHANGE UP
PH UR: 6 — SIGNIFICANT CHANGE UP (ref 5–8)
PLATELET # BLD AUTO: 324 K/UL — SIGNIFICANT CHANGE UP (ref 150–400)
POTASSIUM SERPL-MCNC: 4.2 MMOL/L — SIGNIFICANT CHANGE UP (ref 3.5–5.3)
POTASSIUM SERPL-SCNC: 4.2 MMOL/L — SIGNIFICANT CHANGE UP (ref 3.5–5.3)
PROT SERPL-MCNC: 7.1 G/DL — SIGNIFICANT CHANGE UP (ref 6–8.3)
PROT UR-MCNC: NEGATIVE — SIGNIFICANT CHANGE UP
RBC # BLD: 4.82 M/UL — SIGNIFICANT CHANGE UP (ref 3.8–5.2)
RBC # FLD: 13.3 % — SIGNIFICANT CHANGE UP (ref 10.3–14.5)
RH IG SCN BLD-IMP: POSITIVE — SIGNIFICANT CHANGE UP
SARS-COV-2 RNA SPEC QL NAA+PROBE: SIGNIFICANT CHANGE UP
SODIUM SERPL-SCNC: 137 MMOL/L — SIGNIFICANT CHANGE UP (ref 135–145)
SP GR SPEC: 1.02 — SIGNIFICANT CHANGE UP (ref 1.01–1.02)
UROBILINOGEN FLD QL: SIGNIFICANT CHANGE UP
WBC # BLD: 8.86 K/UL — SIGNIFICANT CHANGE UP (ref 3.8–10.5)
WBC # FLD AUTO: 8.86 K/UL — SIGNIFICANT CHANGE UP (ref 3.8–10.5)

## 2020-12-12 PROCEDURE — 76817 TRANSVAGINAL US OBSTETRIC: CPT | Mod: 26

## 2020-12-12 PROCEDURE — 99285 EMERGENCY DEPT VISIT HI MDM: CPT

## 2020-12-12 PROCEDURE — 99284 EMERGENCY DEPT VISIT MOD MDM: CPT | Mod: GC

## 2020-12-12 NOTE — ED PROVIDER NOTE - PROGRESS NOTE DETAILS
Marco Antonio SINGH: Discussed consult for  ectopic with GYN. They will see patient shortly. GYN cleared pt for dc with close f/u with Dr Wang, pt aware and amenable to plan Marco Antonio SINGH: Attending, Dr. Wang saw patient in the ED and discussed plan for discharge and close follow up with patient in detail.

## 2020-12-12 NOTE — CONSULT NOTE ADULT - SUBJECTIVE AND OBJECTIVE BOX
ANSELMO IZAGUIRRE  30y  Female [id number]    HPI:  30  LMP 11/10/2020 with +UPT at home last week, seen at outside clinic for desired  where she was advised to present to ED for r/o  scar ectopic. Pt learned she was pregnant last week, was referred to [facility] in [place] by her Gyn, Dr. Mosqueda, where she was seen this morning. Pt presently denies vaginal bleeding, cramping, or pain.     Pt has hx of pLTCS 2020 for NRFHT. Pt began her menses 3 months postpartum, learned she was pregnant again in early March, when she had a termination for that unplanned pregnancy. Pt reports regular monthly menses since. Not currently on contraception.      Pt endorses mild reflux and nausea, but otherwise feeling well. Tolerating regular diet, no emesis. No CP/palpitations/SOB. No abdominal pain.     Name of GYN Physician: Dr. Mosqueda  POB:  [facility] 10/2020. D+Cx2 for unplanned pregnancy -  and 3/2020  Pgyn: Denies fibroids, cysts, endometriosis, STI's, Abnormal pap smears  Home meds: None  Allergies: penicillin (Hives)    PAST MEDICAL & SURGICAL HISTORY:  No pertinent past medical history  D+C, C/S as above      Vital Signs Last 24 Hrs  T(C): 36.8 (12 Dec 2020 17:43), Max: 37.4 (12 Dec 2020 11:54)  T(F): 98.3 (12 Dec 2020 17:43), Max: 99.3 (12 Dec 2020 11:54)  HR: 99 (12 Dec 2020 17:43) (81 - 99)  BP: 127/74 (12 Dec 2020 17:43) (127/74 - 133/76)  BP(mean): --  RR: 16 (12 Dec 2020 17:43) (16 - 16)  SpO2: 100% (12 Dec 2020 17:43) (99% - 100%)    Physical Exam:   General: sitting comftorably in bed, NAD   CV: RR S1S2  Lungs: CTA b/l, good air flow b/l   Abd: Soft, non-tender, non-distended.     :  No bleeding on pad.    External labia wnl.  Bimanual exam with no cervical motion tenderness, uterus wnl, adnexa non palpable b/l.  Cervix closedSpeculum Exam: No active bleeding from os.  Physiologic discharge.    **Pt examined with Dr. Wang at bedside**  Ext: non-tender b/l, no edema     LABS:                              13.3   8.86  )-----------( 324      ( 12 Dec 2020 13:13 )             40.3     12-    137  |  103  |  9   ----------------------------<  92  4.2   |  23  |  0.60    Ca    9.8      12 Dec 2020 13:13    TPro  7.1  /  Alb  4.5  /  TBili  0.3  /  DBili  x   /  AST  14  /  ALT  30  /  AlkPhos  82  12    I&O's Detail      Urinalysis Basic - ( 12 Dec 2020 13:13 )    Color: Light Yellow / Appearance: Clear / S.018 / pH: x  Gluc: x / Ketone: Negative  / Bili: Negative / Urobili: <2 mg/dL   Blood: x / Protein: Negative / Nitrite: Negative   Leuk Esterase: Negative / RBC: x / WBC x   Sq Epi: x / Non Sq Epi: x / Bacteria: x    HCG Quantitative, Serum (12.20 @ 13:13)    HCG Quantitative, Serum: 4784.0: For pregnancy evaluation the reference values are as follows:  Negative: <5 mIU/mL  Indeterminate: 5-25 mIU/mL (suggest repeat testing in 72hrs)  Positive: >25 mIU/mL  Note: hCG results of false positive and false negative for pregnancy are  rare, but can occur with this, and other hCG tests. [place]- and  post-menopausal females may have mildly elevated hCG concentrations,  usually less than 14 mIU/mL, that are constant over time.  Weeks of pregnancy:           mIU/mL  ------------------         -------          3                                6 -      71          4                              10 -     750          5                             220 -   7,100          6                             160 -  32,000          7                3,700 - 164,000          8                          32,000 - 150,000          9                          64,000 - 151,000         10                         47,000 - 187,000         12                         28,000 - 211,000         14                         14,000 -  63,000         15                         12,000 -  71,000         16 - 18                   8,000 -  58,000 mIU/mL          RADIOLOGY & ADDITIONAL STUDIES:  < from: US Transvaginal, OB (12.12.20 @ 15:18) >    EXAM:  US OB TRANSVAGINAL        PROCEDURE DATE:  Dec 12 2020         INTERPRETATION:  CLINICAL INFORMATION: 5 weeks gestational age. Outpatient ultrasound concerning for  scar ectopic. Evaluate for  scar ectopic. Serum beta-hCG 4784.    LMP: 11/10/2020.    Estimated Gestational Age by LMP: 4 weeks and 4 days.    COMPARISON: None available.    Endovaginal pelvic sonogram only. Color and Spectral Doppler was performed.    FINDINGS:  Uterus: A  scar is visualized at thelow anterior uterine segment with focal thinning. There is fluid within the endometrial canal the level of the fundus with a subcentimeter echogenic focus which may represent a polyp.    Gestational Sac Size (mean): 0.7 cm correlating with gestational age of 5 weeks and 2 days. Gestational sac is implanted at the  scar and extends partially into the endometrial cavity.  Yolk Sac: Not visualized.  Fetal Heart Rate: Not detected.    Right ovary: 2.6 x 1.6 x 1.9 cm. Within normal limits. Normal arterial and venous waveforms.  Left ovary: 3.6 x 2.4 x 2.6 cm. Within normal limits. Normal arterial and venous waveforms.    Fluid: Trace.    IMPRESSION:    What appears to be a gestational sac without an obvious fetal pole or yolk sac is seen at the lower uterine segment extending into the  scar. This could represent a  ectopic pregnancy. Correlate clinically with beta hCG and serial ultrasound.    Fluid is seen within the fundal portion of endometrium and has an echogenic nodular focus subcentimeter in size which may represent a polyp.            NICHOLE WALTERS MD; Resident Radiology  This document has been electronically signed.  GIGI KNOX MD; Attending Radiologist  This document has been electronically signed. Dec 12 2020  4:03PM    < end of copied text >

## 2020-12-12 NOTE — ED PROVIDER NOTE - PATIENT PORTAL LINK FT
You can access the FollowMyHealth Patient Portal offered by [facility] by registering at the following website: [email or web address]. By joining [employer]’s FollowMyHealth portal, you will also be able to view your health information using other applications (apps) compatible with our system.

## 2020-12-12 NOTE — ED ADULT TRIAGE NOTE - INTERNATIONAL TRAVEL
"  Name: Angeles Guerin YOB: 2002   Gender: Female Age: 14  y.o. 8  m.o.   School: [employer]  Date of Evaluation: 6/22/2017   Grade: rising 10th Race/Ethnicity: White//White     45-minute session of individual therapy (17095) was completed with patient Angeles Guerin and/or her parents in oncology clinic.      Chief Complaint  Angeles was referred by the oncology team due to a recent diagnosis of DLBCL.  Psychosocial factors of adjusting to this diagnosis and associated changes are the reason for psychology involvement.    Content of Current Session  Met with Angeles's parents individually briefly at the beginning of the session.  They expressed concern about Angeles's hair loss and decision to shave off her remaining hair later today, and asked for guidance about how to navigate this with Angeles.  Spent the next 30 minutes meeting individually with Angeles.  She continues to report generally appropriate adjustment to her treatment and diagnosis, with acknowledgement of things that are disappointing or scary, but with a tendency not to dwell on negatives or things that are out of her control and rather often looking toward positive things or the future (e.g., "My hair will grow back").  Angeles anticipated that she may become sad when she shaves her head, but also noted that she may find this to be a relief because she has disliked dealing with the hair loss as it is occurring currently.  Angeles reflected that even if she gets emotional, she would like her parents to remain calm during this time.  Angeles provided her assent for this writer to share that feedback with her parents, and stated she would prefer this writer speaking with them over her talking with them about it.  Angeles indicated happiness that several of her friends will be in [place] more frequently for the remainder of the summer so she can spend more time with them. She expressed appropriate disappointment that she is missing volleyball national " competition right now.  Spent the last 15 minutes of the session meeting individually with her parents.  This writer shared Angeles's feedback about what she needs from them emotionally during and after her head being shaved.  Talked more generally about this writer's impressions that Angeles seems to want to be treated as typically as she would usually be treated, and that she seems most uncomfortable when she is made to be the center of attention or when others make a big deal about something.  Her parents were receptive to this feedback and asked a lot of useful questions.            Based on the diagnostic evaluation and background information provided, the current diagnoses are:     ICD-10-CM ICD-9-CM   1. DLBCL (diffuse large B cell lymphoma) C83.30 202.80   2. Psychological factor affecting cancer C80.1 306.9    F54      Will continue to follow Angeles through oncology clinic approximately every three weeks.  Next session anticipated on 7/13.   No

## 2020-12-12 NOTE — ED PROVIDER NOTE - CLINICAL SUMMARY MEDICAL DECISION MAKING FREE TEXT BOX
pt sent to ED to r/o ectopic pregnancy; NAD at this time  cbc, cmp, type and screen, hcg, ua/ucx, tvus; reassess

## 2020-12-12 NOTE — ED PROVIDER NOTE - OBJECTIVE STATEMENT
29 y/o F no PMH  approx 5 weeks GA sent in from outpt clinic to r/o ectopic pregnancy. Pt states she took home pregnancy test recently which was positive, had appt for termination today, us concerning for  scar ectopic, and t sent to ED. Admits to mild pelvic cramping. Denies fever, chills, CP, SOB, cough, N/V, vaginal bleeding, abnormal vaginal discharge.

## 2020-12-12 NOTE — ED PROVIDER NOTE - ATTENDING CONTRIBUTION TO CARE
Patient is a 29 yo F , LMP, here for evaluation of possible  ectopic pregnancy. Patient states she went to a clinic for termination and had an ultrasound that was concerning for possible  ectopic. Patient has history of 1 . Denies any pain. No fevers, chills, nausea, vomiting, abdominal pain or pelvic pain. No chest pain or shortness of breath.     VS noted  Gen. no acute distress, Non toxic   HEENT: EOMI, mmm  Lungs: CTAB/L no C/ W /R   CVS: RRR   Abd; Soft non tender, non distended   : done with TIFFANI Alvarez - normal external genitalia, scant white discharge, no active bleeding, no cmt, no adnexal tenderness  Ext: no edema  Skin: no rash  Neuro AAOx3 non focal clear speech  a/p: r/o ectopic pregnancy - plan for TVUS, l;abs, u/a.   - Marco Antonio SINGH Patient is a 29 yo F , LMP, here for evaluation of possible  ectopic pregnancy. Patient states she went to a clinic for termination and had an ultrasound that was concerning for possible  ectopic. Patient has history of 1 . Denies any pain. No fevers, chills, nausea, vomiting, abdominal pain or pelvic pain. No chest pain or shortness of breath.     VS noted  Gen. no acute distress, Non toxic   HEENT: EOMI, mmm  Lungs: CTAB/L no C/ W /R   CVS: RRR   Abd; Soft non tender, non distended   : done with TIFFANI Alvarez - normal external genitalia, scant white discharge, no active bleeding, no cmt, no adnexal tenderness  Ext: no edema  Skin: no rash  Neuro AAOx3 non focal clear speech  a/p: r/o ectopic pregnancy - plan for TVUS, l;abs, u/a, GYN eval.   - Marco Antonio SINGH Patient is a 31 yo F , LMP 11/10/20, here for evaluation of possible  ectopic pregnancy. Patient states she went to a clinic for termination and had an ultrasound that was concerning for possible  ectopic. Patient has history of 1 . Denies any pain. No fevers, chills, nausea, vomiting, abdominal pain or pelvic pain. No chest pain or shortness of breath.     VS noted  Gen. no acute distress, Non toxic   HEENT: EOMI, mmm  Lungs: CTAB/L no C/ W /R   CVS: RRR   Abd; Soft non tender, non distended   : done with TIFFANI Alvarez - normal external genitalia, scant white discharge, no active bleeding, no cmt, no adnexal tenderness  Ext: no edema  Skin: no rash  Neuro AAOx3 non focal clear speech  a/p: r/o ectopic pregnancy - plan for TVUS, labs, u/a, GYN eval.   - Marco Antonio SINGH

## 2020-12-12 NOTE — ED ADULT NURSE NOTE - OBJECTIVE STATEMENT
Pt arriving to intake A&Ox4 ambulatory c/o mild intermittent abdominal cramping x 1 week. No significant PMHx. Pt states she found out she was pregnant yesterday and was sent here today for rule out ectopic. Pt denies vaginal bleeding. RR even and unlabored. IV established with 20G in RAC. Labs drawn and sent. VSS and as noted. MD at bedside, will continue to monitor.

## 2020-12-12 NOTE — CONSULT NOTE ADULT - ATTENDING COMMENTS
I saw and evaluated Ms. Orlando and agree with resident assessment and plan as above.   We will schedule outpatient follow up in the Antepartum Testing Unit (4th floor Maternal Fetal Medicine Ultrasound Unit in [facility]) for repeat ultrasound and management plan.   Pelvic pain and bleeding precautions reviewed.     Brenda Wang MD

## 2020-12-12 NOTE — ED PROVIDER NOTE - NSFOLLOWUPINSTRUCTIONS_ED_ALL_ED_FT
Rest, stay hydrated, follow up with Dr Wang in 1-2 days in office. Rest, stay hydrated, follow up with Dr Wang in at [facility] this week for repeat US. You will get a call to establish appointment. Return to ED for any signs of distress, worsening abdominal pain, increased vaginal bleeding, fever, chills, etc.

## 2020-12-12 NOTE — CONSULT NOTE ADULT - ASSESSMENT
A/P   30y   LMP 11/10 presented from outside clinic where went for desired termination, sent for concern for  scar ectopic - with sonographic evidence on in house TVUS concerning for  scar ectopic as well. Gestational sac currently appear to be at edge of scar in NIGEL with extension into scar. No current evidence of yolk sac or fetal pole.     Gestational sac correlates to 5+2, pt is 4+4 by LMP.     Pt seen and counseled with Dr. Wang. Given patient's early gestational age, and lack of current symptom, reasonable to plan for discharge home with close follow up for treatment of suspected  scar ectopic. Counseled patient extensively on diagnosis and potential methods of treatment, including intra-sac and intra-muscular MTX which may be in a matter of days when pregnancy is more developed. Patient provided warning precautions and signs and symptoms to monitor for including severe pain, heavy bleeding.     - Patient to follow up in [facility] ATU this week for repeat TVUS and treatment management. ATU will contact patient directly for appointment.   - Rh type: +, no need for Rhogam   -Ectopic precautions reviewed with patient.  Discussed with patient importance of follow up. Patient expressed understanding.  All questions and concerns addressed to patient's apparent satisfaction.   -patient to be added to Antepartum list for closer follow up.    -patient stable for d/c home from ObGyn perspective.  Primary management per ED team.      Pt seen and counseled with Kathleen Quinones PGY2

## 2020-12-12 NOTE — ED ADULT TRIAGE NOTE - CHIEF COMPLAINT QUOTE
c/o mild cramping, positive home pregnancy test 2 days ago. Went to Clinic today to schedule . Was told 5 wks 1 day gestation. But was also told the pregnancy is too close to the  scar and that it may be ectopic. Denies any vaginal bleeding.  Oct 2019.

## 2020-12-13 LAB
CULTURE RESULTS: NO GROWTH — SIGNIFICANT CHANGE UP
SPECIMEN SOURCE: SIGNIFICANT CHANGE UP

## 2020-12-15 ENCOUNTER — EMERGENCY (EMERGENCY)
Facility: HOSPITAL | Age: 30
LOS: 1 days | Discharge: HOME | End: 2020-12-15
Attending: EMERGENCY MEDICINE | Admitting: EMERGENCY MEDICINE
Payer: COMMERCIAL

## 2020-12-15 VITALS
OXYGEN SATURATION: 100 % | SYSTOLIC BLOOD PRESSURE: 145 MMHG | DIASTOLIC BLOOD PRESSURE: 94 MMHG | TEMPERATURE: 98 F | HEIGHT: 63 IN | RESPIRATION RATE: 18 BRPM | HEART RATE: 100 BPM

## 2020-12-15 DIAGNOSIS — O00.80 OTHER ECTOPIC PREGNANCY WITHOUT INTRAUTERINE PREGNANCY: ICD-10-CM

## 2020-12-15 DIAGNOSIS — Z98.890 OTHER SPECIFIED POSTPROCEDURAL STATES: Chronic | ICD-10-CM

## 2020-12-15 DIAGNOSIS — O03.9 COMPLETE OR UNSPECIFIED SPONTANEOUS ABORTION WITHOUT COMPLICATION: Chronic | ICD-10-CM

## 2020-12-15 LAB
ALBUMIN SERPL ELPH-MCNC: 4.6 G/DL — SIGNIFICANT CHANGE UP (ref 3.3–5)
ALP SERPL-CCNC: 80 U/L — SIGNIFICANT CHANGE UP (ref 40–120)
ALT FLD-CCNC: 17 U/L — SIGNIFICANT CHANGE UP (ref 4–33)
ANION GAP SERPL CALC-SCNC: 14 MMOL/L — SIGNIFICANT CHANGE UP (ref 7–14)
APTT BLD: 30.6 SEC — SIGNIFICANT CHANGE UP (ref 27–36.3)
AST SERPL-CCNC: 10 U/L — SIGNIFICANT CHANGE UP (ref 4–32)
BASE EXCESS BLDV CALC-SCNC: -1.3 MMOL/L — SIGNIFICANT CHANGE UP (ref -3–2)
BASOPHILS # BLD AUTO: 0.04 K/UL — SIGNIFICANT CHANGE UP (ref 0–0.2)
BASOPHILS NFR BLD AUTO: 0.4 % — SIGNIFICANT CHANGE UP (ref 0–2)
BILIRUB SERPL-MCNC: 0.3 MG/DL — SIGNIFICANT CHANGE UP (ref 0.2–1.2)
BLD GP AB SCN SERPL QL: NEGATIVE — SIGNIFICANT CHANGE UP
BLOOD GAS VENOUS - CREATININE: 0.7 MG/DL — SIGNIFICANT CHANGE UP (ref 0.5–1.3)
BLOOD GAS VENOUS COMPREHENSIVE RESULT: SIGNIFICANT CHANGE UP
BUN SERPL-MCNC: 9 MG/DL — SIGNIFICANT CHANGE UP (ref 7–23)
CALCIUM SERPL-MCNC: 9.8 MG/DL — SIGNIFICANT CHANGE UP (ref 8.4–10.5)
CHLORIDE BLDV-SCNC: 106 MMOL/L — SIGNIFICANT CHANGE UP (ref 96–108)
CHLORIDE SERPL-SCNC: 101 MMOL/L — SIGNIFICANT CHANGE UP (ref 98–107)
CO2 SERPL-SCNC: 22 MMOL/L — SIGNIFICANT CHANGE UP (ref 22–31)
CREAT SERPL-MCNC: 0.55 MG/DL — SIGNIFICANT CHANGE UP (ref 0.5–1.3)
EOSINOPHIL # BLD AUTO: 0.02 K/UL — SIGNIFICANT CHANGE UP (ref 0–0.5)
EOSINOPHIL NFR BLD AUTO: 0.2 % — SIGNIFICANT CHANGE UP (ref 0–6)
GAS PNL BLDV: 137 MMOL/L — SIGNIFICANT CHANGE UP (ref 136–146)
GLUCOSE BLDV-MCNC: 84 MG/DL — SIGNIFICANT CHANGE UP (ref 70–99)
GLUCOSE SERPL-MCNC: 81 MG/DL — SIGNIFICANT CHANGE UP (ref 70–99)
HCO3 BLDV-SCNC: 22 MMOL/L — SIGNIFICANT CHANGE UP (ref 20–27)
HCT VFR BLD CALC: 43.9 % — SIGNIFICANT CHANGE UP (ref 34.5–45)
HCT VFR BLDA CALC: 44.8 % — SIGNIFICANT CHANGE UP (ref 34.5–46.5)
HGB BLD CALC-MCNC: 14.6 G/DL — SIGNIFICANT CHANGE UP (ref 11.5–15.5)
HGB BLD-MCNC: 13.9 G/DL — SIGNIFICANT CHANGE UP (ref 11.5–15.5)
IANC: 6.89 K/UL — SIGNIFICANT CHANGE UP (ref 1.5–8.5)
IMM GRANULOCYTES NFR BLD AUTO: 0.4 % — SIGNIFICANT CHANGE UP (ref 0–1.5)
INR BLD: 1.05 RATIO — SIGNIFICANT CHANGE UP (ref 0.88–1.17)
LACTATE BLDV-MCNC: 2.9 MMOL/L — HIGH (ref 0.5–2)
LYMPHOCYTES # BLD AUTO: 3.43 K/UL — HIGH (ref 1–3.3)
LYMPHOCYTES # BLD AUTO: 30.4 % — SIGNIFICANT CHANGE UP (ref 13–44)
MCHC RBC-ENTMCNC: 27.1 PG — SIGNIFICANT CHANGE UP (ref 27–34)
MCHC RBC-ENTMCNC: 31.7 GM/DL — LOW (ref 32–36)
MCV RBC AUTO: 85.7 FL — SIGNIFICANT CHANGE UP (ref 80–100)
MONOCYTES # BLD AUTO: 0.88 K/UL — SIGNIFICANT CHANGE UP (ref 0–0.9)
MONOCYTES NFR BLD AUTO: 7.8 % — SIGNIFICANT CHANGE UP (ref 2–14)
NEUTROPHILS # BLD AUTO: 6.89 K/UL — SIGNIFICANT CHANGE UP (ref 1.8–7.4)
NEUTROPHILS NFR BLD AUTO: 60.8 % — SIGNIFICANT CHANGE UP (ref 43–77)
NRBC # BLD: 0 /100 WBCS — SIGNIFICANT CHANGE UP
NRBC # FLD: 0 K/UL — SIGNIFICANT CHANGE UP
PCO2 BLDV: 46 MMHG — SIGNIFICANT CHANGE UP (ref 41–51)
PH BLDV: 7.34 — SIGNIFICANT CHANGE UP (ref 7.32–7.43)
PLATELET # BLD AUTO: 379 K/UL — SIGNIFICANT CHANGE UP (ref 150–400)
PO2 BLDV: 32 MMHG — LOW (ref 35–40)
POTASSIUM BLDV-SCNC: 3.3 MMOL/L — LOW (ref 3.4–4.5)
POTASSIUM SERPL-MCNC: 3.4 MMOL/L — LOW (ref 3.5–5.3)
POTASSIUM SERPL-SCNC: 3.4 MMOL/L — LOW (ref 3.5–5.3)
PROT SERPL-MCNC: 7.5 G/DL — SIGNIFICANT CHANGE UP (ref 6–8.3)
PROTHROM AB SERPL-ACNC: 11.9 SEC — SIGNIFICANT CHANGE UP (ref 9.8–13.1)
RBC # BLD: 5.12 M/UL — SIGNIFICANT CHANGE UP (ref 3.8–5.2)
RBC # FLD: 13.3 % — SIGNIFICANT CHANGE UP (ref 10.3–14.5)
RH IG SCN BLD-IMP: POSITIVE — SIGNIFICANT CHANGE UP
SAO2 % BLDV: 50.2 % — LOW (ref 60–85)
SODIUM SERPL-SCNC: 137 MMOL/L — SIGNIFICANT CHANGE UP (ref 135–145)
WBC # BLD: 11.3 K/UL — HIGH (ref 3.8–10.5)
WBC # FLD AUTO: 11.3 K/UL — HIGH (ref 3.8–10.5)

## 2020-12-15 PROCEDURE — 99284 EMERGENCY DEPT VISIT MOD MDM: CPT

## 2020-12-15 PROCEDURE — 76830 TRANSVAGINAL US NON-OB: CPT | Mod: 26

## 2020-12-15 RX ORDER — SODIUM CHLORIDE 9 MG/ML
1000 INJECTION INTRAMUSCULAR; INTRAVENOUS; SUBCUTANEOUS ONCE
Refills: 0 | Status: COMPLETED | OUTPATIENT
Start: 2020-12-15 | End: 2020-12-15

## 2020-12-15 RX ADMIN — SODIUM CHLORIDE 1000 MILLILITER(S): 9 INJECTION INTRAMUSCULAR; INTRAVENOUS; SUBCUTANEOUS at 16:30

## 2020-12-15 NOTE — CONSULT NOTE ADULT - PROBLEM SELECTOR RECOMMENDATION 9
- M Attending, Dr. Urbina reviewed imaging. As patient is stable without pain or bleeding, recommended patient follow up at prior scheduled ATU visit and consult - HOWEVER appointment to be moved up from currently scheduled 12/22 date.  - ATU to be emailed tonight for earlier appointment.   - No acute gyn interventions.    Patient seen and d/w Dr. Yeboah, Service Attending  Plan d/w M Attending, Dr. Urbina  ADomney PGY-2

## 2020-12-15 NOTE — ED PROVIDER NOTE - ATTENDING CONTRIBUTION TO CARE
DR. BLOCH, ATTENDING MD-  I performed a face to face bedside interview with patient regarding history of present illness, review of symptoms and past medical history. I completed an independent physical exam.  I have discussed patient's plan of care with the resident.  Patient examined, well appearing NAD Heart sounds nml lungs clear, abd soft nontenderno CVA tenderness, neuro nml DR. BLOCH, ATTENDING MD-  I performed a face to face bedside interview with patient regarding history of present illness, review of symptoms and past medical history. I completed an independent physical exam.  I have discussed patient's plan of care with the pa.  Patient examined, well appearing NAD Heart sounds nml lungs clear, abd soft nontenderno CVA tenderness, neuro nml

## 2020-12-15 NOTE — ED ADULT TRIAGE NOTE - CHIEF COMPLAINT QUOTE
Pt states she has a confirmed ectopic and is having spotting and slight cramping. Pt denies chest pain, sob, n/v/d, fever or chills.

## 2020-12-15 NOTE — ED PROVIDER NOTE - CLINICAL SUMMARY MEDICAL DECISION MAKING FREE TEXT BOX
29 y/o F no PMH  ~5 weeks pregnant, known ectopic pregnancy on  scar, here for lower abdominal pain/lower back pain w/ vaginal spotting today. Plan repeat TVUS, labs/hcg, gyn eval, reassess

## 2020-12-15 NOTE — ED POST DISCHARGE NOTE - RESULT SUMMARY
Pt states having some abd pain and spotting, has known ectopic pregnancy, has appt with obgyn to get methotrexate on 12/22 with Dr Henrry ALTAMIRANO. Adivsed to come back to ED to be evaluated right away, pt states she will come right away, not having weakness, not having large amount of bleeding from below.

## 2020-12-15 NOTE — ED PROVIDER NOTE - PROGRESS NOTE DETAILS
TIFFANI Delatorre: TVUS w/ gestational sac w/ yolk at  scar site. Will call gyn consult. TIFFANI ZAPATA: gyn saw pt, pt will be d/c, her gyn made aware, they will do intrauterine methotrexate putpatient

## 2020-12-15 NOTE — CONSULT NOTE ADULT - ATTENDING COMMENTS
Agree with above resident note.  Pt denied pain at the time I went to evaluate her.  Pt states she saw a "spot of brown" and became worried and was told to come to the ED for evaluation.  VSS.  Abd soft nontender, no rebound, no guarding.  USN today now with GS and YS.  Images reviewed with Dr. Urbina who advised that we send and email to [facility] so that pt's appt can be moved up from 12/22.  Discussed with Dr. Almaguer no acute interventions at this time or need for admission.  Strict pain and bleeding precautions given.  Pt with good understanding, confirmed by teach back method.  Pt to await call from ATU with new appt.  Pt will remain on ectopic follow up list.      Berenice Yeboah MD

## 2020-12-15 NOTE — ED PROVIDER NOTE - PATIENT PORTAL LINK FT
You can access the FollowMyHealth Patient Portal offered by [facility] by registering at the following website: [email or web address]. By joining [employer]’s FollowMyHealth portal, you will also be able to view your health information using other applications (apps) compatible with our system.

## 2020-12-15 NOTE — ED PROVIDER NOTE - CONSTITUTIONAL, MLM
normal... Well appearing, awake, alert, oriented to person, place, time/situation and in no apparent distress. normal balance

## 2020-12-15 NOTE — CONSULT NOTE ADULT - SUBJECTIVE AND OBJECTIVE BOX
GYN Consult Note    30  LMP 11/10 p/w spotting x 1 day. Patient states she wiped and there was brown spot on the tissue. She became nervous and came to ED. Denies abdominal pain, bright red blood. Patient states is tired and hungry and feels overall well. Denies SOB, CP, lightheadedness, dizziness, palpitations      History copied from previous consult by Dr. Amin PGY-2:  30  LMP 11/10/2020 with +UPT at home last week, seen at outside clinic for desired  where she was advised to present to ED for r/o  scar ectopic. Pt learned she was pregnant last week, was referred to [facility] in [place] by her Gyn, Dr. Mosqueda, where she was seen this morning. Pt presently denies vaginal bleeding, cramping, or pain.     Pt has hx of pLTCS 2020 for NRFHT. Pt began her menses 3 months postpartum, learned she was pregnant again in early March, when she had a termination for that unplanned pregnancy. Pt reports regular monthly menses since. Not currently on contraception.      Pt endorses mild reflux and nausea, but otherwise feeling well. Tolerating regular diet, no emesis. No CP/palpitations/SOB. No abdominal pain.     Name of GYN Physician: Dr. Mosqueda  POB:  pLTCS 10/2020. D+Cx2 for unplanned pregnancy -  and 3/2020  Pgyn: Denies fibroids, cysts, endometriosis, STI's, Abnormal pap smears  Home meds: None  Allergies: penicillin (Hives)    PAST MEDICAL & SURGICAL HISTORY:  No pertinent past medical history  D+C, C/S as above      REVIEW OF SYSTEMS  General: denies fevers, chills, tiredness  Skin/Breast: denies breast pain  Respiratory and Thorax: denies shortness of breath, denies cough  Cardiovascular: denies chest pain and denies palpitations  Gastrointestinal: denies abdominal pain, nausea/ vomiting	  Genitourinary: denies dysuria, increased urinary frequency, urgency	  Constitutional, Cardiovascular, Respiratory, Gastrointestinal, Genitourinary, Musculoskeletal and Integumentary review of systems are normal except as noted. 	    Meds: none    Allergies    penicillin (Hives)    Intolerances      SOCIAL HISTORY: denies t/e/d        Vital Signs Last 24 Hrs  T(C): 36.5 (15 Dec 2020 15:05), Max: 36.5 (15 Dec 2020 15:05)  T(F): 97.7 (15 Dec 2020 15:05), Max: 97.7 (15 Dec 2020 15:05)  HR: 100 (15 Dec 2020 15:05) (100 - 100)  BP: 145/94 (15 Dec 2020 15:05) (145/94 - 145/94)  BP(mean): --  RR: 18 (15 Dec 2020 15:05) (18 - 18)  SpO2: 100% (15 Dec 2020 15:05) (100% - 100%)      PHYSICAL EXAM:   Gen: NAD, alert and oriented x 3  Cardiovascular: regular   Respiratory: breathing comfortably on RA  Abd: soft, non tender, non-distended  Speculum: small amount of brown mixed with physiologic discharge, cervix appears grossly normal, closed  Extremities: NTBL  Skin: warm and well perfused      LABS:                        13.9   11.30 )-----------( 379      ( 15 Dec 2020 18:06 )             43.9     12-15    137  |  101  |  9   ----------------------------<  81  3.4<L>   |  22  |  0.55    Ca    9.8      15 Dec 2020 18:06    TPro  7.5  /  Alb  4.6  /  TBili  0.3  /  DBili  x   /  AST  10  /  ALT  17  /  AlkPhos  80  12-15    PT/INR - ( 15 Dec 2020 18:06 )   PT: 11.9 sec;   INR: 1.05 ratio         PTT - ( 15 Dec 2020 18:06 )  PTT:30.6 sec      RADIOLOGY & ADDITIONAL STUDIES:  < from: US Transvaginal (12.15.20 @ 17:59) >  FINDINGS:    Uterus: A gestational sac at the  scar is again noted now with the yolk sac. Tiny endometrial polyp.    Gestational sac size: 1.1 cm.  Estimated GA by GS size: 5 weeks 6 days.  Yolk sac present: yes    There is a possible pediculated fibroid measuring 0.8 x 0.6 x 0.6 cm.    Right ovary: 2.2 cm x 2.5 cm x 1.8 cm. Within normal limits. There is a 0.6 x 0.5 x 3.3 cm echogenic foci.  Left ovary: 3.4 cm x 2.8 cm x 2.5 cm. Within normal limits. The corpus luteum measures 1.8 x 1.9 x 1.6 cm.    Fluid: None.    IMPRESSION:  Again noted is a gestational sac at the lower uterine segment extending into the  scar now with yolk sac.              RADHA LEZAMA MD; Resident Radiology  This document has been electronically signed.  CHRIS PRABHAKAR MD; Attending Radiologist  This document has been electronically signed. Dec 15 2020  6:46PM    < end of copied text >

## 2020-12-15 NOTE — ED ADULT NURSE NOTE - OBJECTIVE STATEMENT
pt a&o x3 c/o lower abdominal and back pain/ cramping with spotting. pt states spotting is "light" and only noted when she wipes. states she was told on saturday that she had an ectopic and recommended methotrexate administration with an appointment to return for it. denies chest pain or sob but reports some lightheadedness. left ac 20g placed. md assessed.

## 2020-12-15 NOTE — CONSULT NOTE ADULT - ASSESSMENT
30  LMP 11/10 with known  scar ectopic p/w brown spotting x 1 day. Vital signs stable, denies abdominal pain, denies bright red vaginal bleeding, with stable labs.

## 2020-12-15 NOTE — ED PROVIDER NOTE - OBJECTIVE STATEMENT
29 y/o F no PMH  ~5 weeks pregnant, known ectopic pregnancy on  scar, here for lower abdominal pain/lower back pain w/ vaginal spotting today. States she has an appt on 2020 with L&D for repeat labs and TVUS, however was concerned given spotting today. No pain at present. Denies fevers, chills, dizziness or any other complaints.

## 2020-12-18 ENCOUNTER — APPOINTMENT (OUTPATIENT)
Dept: ANTEPARTUM | Facility: CLINIC | Age: 30
End: 2020-12-18
Payer: COMMERCIAL

## 2020-12-18 ENCOUNTER — ASOB RESULT (OUTPATIENT)
Age: 30
End: 2020-12-18

## 2020-12-18 ENCOUNTER — APPOINTMENT (OUTPATIENT)
Dept: ANTEPARTUM | Facility: CLINIC | Age: 30
End: 2020-12-18

## 2020-12-18 PROCEDURE — 36415 COLL VENOUS BLD VENIPUNCTURE: CPT

## 2020-12-18 PROCEDURE — 76801 OB US < 14 WKS SINGLE FETUS: CPT

## 2020-12-18 PROCEDURE — 99242 OFF/OP CONSLTJ NEW/EST SF 20: CPT | Mod: 25

## 2020-12-18 PROCEDURE — 99072 ADDL SUPL MATRL&STAF TM PHE: CPT

## 2020-12-21 ENCOUNTER — APPOINTMENT (OUTPATIENT)
Dept: ANTEPARTUM | Facility: CLINIC | Age: 30
End: 2020-12-21

## 2020-12-21 LAB — HCG SERPL-MCNC: ABNORMAL MIU/ML

## 2020-12-22 ENCOUNTER — OUTPATIENT (OUTPATIENT)
Dept: OUTPATIENT SERVICES | Facility: HOSPITAL | Age: 30
LOS: 1 days | End: 2020-12-22

## 2020-12-22 ENCOUNTER — TRANSCRIPTION ENCOUNTER (OUTPATIENT)
Age: 30
End: 2020-12-22

## 2020-12-22 VITALS
HEIGHT: 62.5 IN | WEIGHT: 154.98 LBS | DIASTOLIC BLOOD PRESSURE: 80 MMHG | SYSTOLIC BLOOD PRESSURE: 133 MMHG | HEART RATE: 63 BPM | OXYGEN SATURATION: 98 % | RESPIRATION RATE: 16 BRPM | TEMPERATURE: 98 F

## 2020-12-22 DIAGNOSIS — O03.9 COMPLETE OR UNSPECIFIED SPONTANEOUS ABORTION WITHOUT COMPLICATION: Chronic | ICD-10-CM

## 2020-12-22 DIAGNOSIS — O00.90 UNSPECIFIED ECTOPIC PREGNANCY WITHOUT INTRAUTERINE PREGNANCY: ICD-10-CM

## 2020-12-22 DIAGNOSIS — O03.9 COMPLETE OR UNSPECIFIED SPONTANEOUS ABORTION WITHOUT COMPLICATION: ICD-10-CM

## 2020-12-22 DIAGNOSIS — Z87.59 PERSONAL HISTORY OF OTHER COMPLICATIONS OF PREGNANCY, CHILDBIRTH AND THE PUERPERIUM: Chronic | ICD-10-CM

## 2020-12-22 DIAGNOSIS — Z98.890 OTHER SPECIFIED POSTPROCEDURAL STATES: Chronic | ICD-10-CM

## 2020-12-22 DIAGNOSIS — Z11.59 ENCOUNTER FOR SCREENING FOR OTHER VIRAL DISEASES: ICD-10-CM

## 2020-12-22 DIAGNOSIS — Z98.891 HISTORY OF UTERINE SCAR FROM PREVIOUS SURGERY: Chronic | ICD-10-CM

## 2020-12-22 LAB
BLD GP AB SCN SERPL QL: NEGATIVE — SIGNIFICANT CHANGE UP
HCT VFR BLD CALC: 39.4 % — SIGNIFICANT CHANGE UP (ref 34.5–45)
HGB BLD-MCNC: 12.8 G/DL — SIGNIFICANT CHANGE UP (ref 11.5–15.5)
MCHC RBC-ENTMCNC: 27.6 PG — SIGNIFICANT CHANGE UP (ref 27–34)
MCHC RBC-ENTMCNC: 32.5 GM/DL — SIGNIFICANT CHANGE UP (ref 32–36)
MCV RBC AUTO: 84.9 FL — SIGNIFICANT CHANGE UP (ref 80–100)
NRBC # BLD: 0 /100 WBCS — SIGNIFICANT CHANGE UP
NRBC # FLD: 0 K/UL — SIGNIFICANT CHANGE UP
PLATELET # BLD AUTO: 325 K/UL — SIGNIFICANT CHANGE UP (ref 150–400)
RBC # BLD: 4.64 M/UL — SIGNIFICANT CHANGE UP (ref 3.8–5.2)
RBC # FLD: 13.4 % — SIGNIFICANT CHANGE UP (ref 10.3–14.5)
RH IG SCN BLD-IMP: POSITIVE — SIGNIFICANT CHANGE UP
SARS-COV-2 RNA SPEC QL NAA+PROBE: SIGNIFICANT CHANGE UP
WBC # BLD: 12.06 K/UL — HIGH (ref 3.8–10.5)
WBC # FLD AUTO: 12.06 K/UL — HIGH (ref 3.8–10.5)

## 2020-12-22 RX ORDER — SODIUM CHLORIDE 9 MG/ML
1000 INJECTION, SOLUTION INTRAVENOUS
Refills: 0 | Status: DISCONTINUED | OUTPATIENT
Start: 2020-12-23 | End: 2021-01-07

## 2020-12-22 NOTE — H&P PST ADULT - NSICDXPASTMEDICALHX_GEN_ALL_CORE_FT
PAST MEDICAL HISTORY:  Seasonal allergies     Seasonal asthma "but I haven't used my inhaler in 2 years, Albuterol"     PAST MEDICAL HISTORY:  Ectopic pregnancy     Obese     Seasonal allergies     Seasonal asthma "but I haven't used my inhaler in 2 years, Albuterol"

## 2020-12-22 NOTE — ASU PATIENT PROFILE, ADULT - PMH
Ectopic pregnancy    Obese    Seasonal allergies    Seasonal asthma  "but I haven't used my inhaler in 2 years, Albuterol"

## 2020-12-22 NOTE — H&P PST ADULT - NSICDXPROBLEM_GEN_ALL_CORE_FT
PROBLEM DIAGNOSES  Problem: Ectopic pregnancy  Assessment and Plan: Pt. is scheduled for diagnostic dilation and curettage 12/23/20.  Pt. verbalized understanding of instructions.  Pt. had COVID test.  Discussed with Dr. Jarvis.        PROBLEM DIAGNOSES  Problem: Ectopic pregnancy  Assessment and Plan: Pt. is scheduled for diagnostic dilation and curettage 12/23/20.  Pt. verbalized understanding of instructions.  Pt. had COVID test.  Discussed with Dr. Jarvis.  Notified Mary Grace in OR booking of shellfish and PCN allergy.

## 2020-12-22 NOTE — H&P PST ADULT - ATTENDING COMMENTS
I saw and evaluated Ms. Orlando this afternoon.   Repeat ultrasound examination this morning reveals 6-week intrauterine gestation, no evidence of  ectopic gestation.   Plan for elective interruption of this undesired pregnancy by dilation and curettage.   She desires Mirena IUD insertion.   Risks/benefits/alternatives discussed.   Consents signed and placed on chart.   All questions answered.     Brenda Wang MD

## 2020-12-23 ENCOUNTER — APPOINTMENT (OUTPATIENT)
Dept: ANTEPARTUM | Facility: CLINIC | Age: 30
End: 2020-12-23
Payer: COMMERCIAL

## 2020-12-23 ENCOUNTER — ASOB RESULT (OUTPATIENT)
Age: 30
End: 2020-12-23

## 2020-12-23 ENCOUNTER — NON-APPOINTMENT (OUTPATIENT)
Age: 30
End: 2020-12-23

## 2020-12-23 ENCOUNTER — RESULT REVIEW (OUTPATIENT)
Age: 30
End: 2020-12-23

## 2020-12-23 ENCOUNTER — OUTPATIENT (OUTPATIENT)
Dept: OUTPATIENT SERVICES | Facility: HOSPITAL | Age: 30
LOS: 1 days | Discharge: HOME | End: 2020-12-23
Payer: COMMERCIAL

## 2020-12-23 VITALS
HEIGHT: 62.5 IN | SYSTOLIC BLOOD PRESSURE: 128 MMHG | OXYGEN SATURATION: 100 % | HEART RATE: 89 BPM | WEIGHT: 154.98 LBS | RESPIRATION RATE: 15 BRPM | DIASTOLIC BLOOD PRESSURE: 78 MMHG | TEMPERATURE: 99 F

## 2020-12-23 VITALS
DIASTOLIC BLOOD PRESSURE: 86 MMHG | HEART RATE: 84 BPM | SYSTOLIC BLOOD PRESSURE: 117 MMHG | OXYGEN SATURATION: 98 % | RESPIRATION RATE: 18 BRPM

## 2020-12-23 DIAGNOSIS — Z98.891 HISTORY OF UTERINE SCAR FROM PREVIOUS SURGERY: Chronic | ICD-10-CM

## 2020-12-23 DIAGNOSIS — Z87.59 PERSONAL HISTORY OF OTHER COMPLICATIONS OF PREGNANCY, CHILDBIRTH AND THE PUERPERIUM: Chronic | ICD-10-CM

## 2020-12-23 DIAGNOSIS — O03.9 COMPLETE OR UNSPECIFIED SPONTANEOUS ABORTION WITHOUT COMPLICATION: ICD-10-CM

## 2020-12-23 PROCEDURE — 59840 INDUCED ABORTION D&C: CPT | Mod: GC

## 2020-12-23 PROCEDURE — 58300 INSERT INTRAUTERINE DEVICE: CPT | Mod: GC

## 2020-12-23 PROCEDURE — 76817 TRANSVAGINAL US OBSTETRIC: CPT

## 2020-12-23 PROCEDURE — 99072 ADDL SUPL MATRL&STAF TM PHE: CPT

## 2020-12-23 PROCEDURE — 88305 TISSUE EXAM BY PATHOLOGIST: CPT | Mod: 26

## 2020-12-23 PROCEDURE — 76815 OB US LIMITED FETUS(S): CPT

## 2020-12-23 PROCEDURE — 99241 OFFICE CONSULTATION NEW/ESTAB PATIENT 15 MIN: CPT | Mod: 25

## 2020-12-23 RX ORDER — SODIUM CHLORIDE 9 MG/ML
1000 INJECTION, SOLUTION INTRAVENOUS
Refills: 0 | Status: DISCONTINUED | OUTPATIENT
Start: 2020-12-23 | End: 2021-01-07

## 2020-12-23 RX ORDER — IBUPROFEN 200 MG
600 TABLET ORAL EVERY 6 HOURS
Refills: 0 | Status: DISCONTINUED | OUTPATIENT
Start: 2020-12-23 | End: 2021-01-07

## 2020-12-23 NOTE — BRIEF OPERATIVE NOTE - NSICDXBRIEFPROCEDURE_GEN_ALL_CORE_FT
PROCEDURES:  Dilation and evacuation of uterus using suction curettage 23-Dec-2020 16:31:18  Maximilian Bartlett

## 2020-12-23 NOTE — BRIEF OPERATIVE NOTE - OPERATION/FINDINGS
products of conception sent to pathology. Mirena placed Lot #[id number], exp Jan 2023. products of conception sent to pathology. Mirena placed Lot #[id number], exp Jan 2023. Dictation #[id number]

## 2020-12-23 NOTE — ASU DISCHARGE PLAN (ADULT/PEDIATRIC) - NURSING INSTRUCTIONS
You were given intravenous TYLENOL for pain management at _3:30pm_. Please DO NOT take any products containing TYLENOL or ACETAMINOPHEN, for the next 6 hours (until _9:30pm_). This includes medications such as VICODIN, PERCOCET, EXCEDRIN, and any over-the-counter cold medication. DO NOT TAKE MORE THAN 3000 MG OF TYLENOL in a 24 hour period.

## 2020-12-23 NOTE — ASU DISCHARGE PLAN (ADULT/PEDIATRIC) - CARE PROVIDER_API CALL
Brenda Wang)  Obstetrics and Gynecology  [id number] [place] [id number]  Phone: [phone]  Fax: [phone]  Follow Up Time: 2 weeks

## 2020-12-29 LAB — SURGICAL PATHOLOGY STUDY: SIGNIFICANT CHANGE UP

## 2020-12-30 PROBLEM — J30.2 OTHER SEASONAL ALLERGIC RHINITIS: Chronic | Status: ACTIVE | Noted: 2020-12-22

## 2020-12-30 PROBLEM — E66.9 OBESITY, UNSPECIFIED: Chronic | Status: ACTIVE | Noted: 2020-12-22

## 2020-12-30 PROBLEM — J45.998 OTHER ASTHMA: Chronic | Status: ACTIVE | Noted: 2020-12-22

## 2020-12-30 PROBLEM — O00.90 UNSPECIFIED ECTOPIC PREGNANCY WITHOUT INTRAUTERINE PREGNANCY: Chronic | Status: ACTIVE | Noted: 2020-12-22

## 2021-01-25 ENCOUNTER — APPOINTMENT (OUTPATIENT)
Dept: OBGYN | Facility: CLINIC | Age: 31
End: 2021-01-25
Payer: COMMERCIAL

## 2021-01-25 VITALS
OXYGEN SATURATION: 98 % | HEIGHT: 64 IN | DIASTOLIC BLOOD PRESSURE: 73 MMHG | TEMPERATURE: 98 F | SYSTOLIC BLOOD PRESSURE: 115 MMHG | HEART RATE: 80 BPM | WEIGHT: 155 LBS | BODY MASS INDEX: 26.46 KG/M2

## 2021-01-25 DIAGNOSIS — O00.90 UNSPECIFIED. ECTOPIC. PREGNANCY WITHOUT INTRAUTERINE PREGNANCY: ICD-10-CM

## 2021-01-25 DIAGNOSIS — N92.6 IRREGULAR MENSTRUATION, UNSPECIFIED: ICD-10-CM

## 2021-01-25 PROCEDURE — 99072 ADDL SUPL MATRL&STAF TM PHE: CPT

## 2021-01-25 PROCEDURE — 99213 OFFICE O/P EST LOW 20 MIN: CPT

## 2021-01-25 RX ORDER — CHLORHEXIDINE GLUCONATE 4 %
325 (65 FE) LIQUID (ML) TOPICAL TWICE DAILY
Qty: 60 | Refills: 3 | Status: COMPLETED | COMMUNITY
Start: 2019-08-14 | End: 2021-01-25

## 2021-01-25 RX ORDER — PNV NO.95/FERROUS FUM/FOLIC AC 28MG-0.8MG
28-0.8 TABLET ORAL DAILY
Qty: 30 | Refills: 0 | Status: COMPLETED | COMMUNITY
Start: 2019-07-22 | End: 2021-01-25

## 2021-01-25 NOTE — HISTORY OF PRESENT ILLNESS
[Regular Cycle Intervals] : periods have been regular [Menarche Age: ____] : age at menarche was [unfilled] [FreeTextEntry1] : 11/10/2020 [Currently Active] : currently active [Men] : men [Vaginal] : vaginal [No] : No

## 2021-01-25 NOTE — PLAN
[FreeTextEntry1] : patient has d&c,mirena placed in her uterus for contraception.having irregular spotting .advised serum hcg

## 2021-01-25 NOTE — PHYSICAL EXAM
[Appropriately responsive] : appropriately responsive [Alert] : alert [No Acute Distress] : no acute distress [No Lymphadenopathy] : no lymphadenopathy [Regular Rate Rhythm] : regular rate rhythm [No Murmurs] : no murmurs [Clear to Auscultation B/L] : clear to auscultation bilaterally [Soft] : soft [Non-tender] : non-tender [Non-distended] : non-distended [No HSM] : No HSM [No Lesions] : no lesions [No Mass] : no mass [Oriented x3] : oriented x3 [Examination Of The Breasts] : a normal appearance [No Masses] : no breast masses were palpable [IUD String] : an IUD string was noted [Normal] : normal

## 2021-01-26 LAB
C TRACH RRNA SPEC QL NAA+PROBE: NOT DETECTED
HPV HIGH+LOW RISK DNA PNL CVX: NOT DETECTED
N GONORRHOEA RRNA SPEC QL NAA+PROBE: NOT DETECTED
SOURCE TP AMPLIFICATION: NORMAL

## 2021-01-30 LAB — CYTOLOGY CVX/VAG DOC THIN PREP: ABNORMAL

## 2021-02-09 LAB
BASOPHILS # BLD AUTO: 0.04 K/UL
BASOPHILS NFR BLD AUTO: 0.5 %
EOSINOPHIL # BLD AUTO: 0.05 K/UL
EOSINOPHIL NFR BLD AUTO: 0.6 %
HCG SERPL-MCNC: 40 MIU/ML
HCT VFR BLD CALC: 43 %
HGB BLD-MCNC: 13.8 G/DL
IMM GRANULOCYTES NFR BLD AUTO: 0.4 %
LYMPHOCYTES # BLD AUTO: 2.34 K/UL
LYMPHOCYTES NFR BLD AUTO: 29.1 %
MAN DIFF?: NORMAL
MCHC RBC-ENTMCNC: 28.2 PG
MCHC RBC-ENTMCNC: 32.1 GM/DL
MCV RBC AUTO: 87.8 FL
MONOCYTES # BLD AUTO: 0.64 K/UL
MONOCYTES NFR BLD AUTO: 8 %
NEUTROPHILS # BLD AUTO: 4.94 K/UL
NEUTROPHILS NFR BLD AUTO: 61.4 %
PLATELET # BLD AUTO: 308 K/UL
RBC # BLD: 4.9 M/UL
RBC # FLD: 13.6 %
WBC # FLD AUTO: 8.04 K/UL

## 2021-03-02 ENCOUNTER — APPOINTMENT (OUTPATIENT)
Dept: OBGYN | Facility: CLINIC | Age: 31
End: 2021-03-02

## 2021-03-02 ENCOUNTER — APPOINTMENT (OUTPATIENT)
Dept: OBGYN | Facility: CLINIC | Age: 31
End: 2021-03-02
Payer: COMMERCIAL

## 2021-03-02 VITALS
TEMPERATURE: 98.4 F | HEIGHT: 64 IN | WEIGHT: 155 LBS | HEART RATE: 85 BPM | DIASTOLIC BLOOD PRESSURE: 79 MMHG | SYSTOLIC BLOOD PRESSURE: 126 MMHG | OXYGEN SATURATION: 98 % | BODY MASS INDEX: 26.46 KG/M2

## 2021-03-02 PROCEDURE — 99072 ADDL SUPL MATRL&STAF TM PHE: CPT

## 2021-03-02 PROCEDURE — 99213 OFFICE O/P EST LOW 20 MIN: CPT

## 2021-03-02 NOTE — HISTORY OF PRESENT ILLNESS
[Localized] : localized [Menses] : not menses [Sayre] : not intercourse [Urination] : not urination [Bowel Movement] : not bowel movement [TextBox_10] : trina [Menarche Age: ____] : age at menarche was [unfilled] [FreeTextEntry1] : 01/25/2021 [Currently Active] : currently active [Men] : men [Vaginal] : vaginal [No] : No

## 2021-03-02 NOTE — PHYSICAL EXAM
[Appropriately responsive] : appropriately responsive [Alert] : alert [No Acute Distress] : no acute distress [No Lymphadenopathy] : no lymphadenopathy [Regular Rate Rhythm] : regular rate rhythm [No Murmurs] : no murmurs [Clear to Auscultation B/L] : clear to auscultation bilaterally [Soft] : soft [Non-tender] : non-tender [Non-distended] : non-distended [No HSM] : No HSM [No Lesions] : no lesions [No Mass] : no mass [Oriented x3] : oriented x3 [Examination Of The Breasts] : a normal appearance [No Masses] : no breast masses were palpable [Normal] : normal [Discharge] : discharge [Scant] : scant [Foul Smelling] : not foul smelling [White] : white [Thin] : thin [IUD String] : an IUD string was noted

## 2021-03-03 LAB
APPEARANCE: CLEAR
BACTERIA: NEGATIVE
BILIRUBIN URINE: NEGATIVE
BLOOD URINE: NORMAL
C TRACH RRNA SPEC QL NAA+PROBE: NOT DETECTED
CANDIDA VAG CYTO: NOT DETECTED
COLOR: YELLOW
G VAGINALIS+PREV SP MTYP VAG QL MICRO: NOT DETECTED
GLUCOSE QUALITATIVE U: NEGATIVE
HCG SERPL-MCNC: 6 MIU/ML
HYALINE CASTS: 1 /LPF
KETONES URINE: NEGATIVE
LEUKOCYTE ESTERASE URINE: NEGATIVE
MICROSCOPIC-UA: NORMAL
N GONORRHOEA RRNA SPEC QL NAA+PROBE: NOT DETECTED
NITRITE URINE: NEGATIVE
PH URINE: 6
PROTEIN URINE: ABNORMAL
RED BLOOD CELLS URINE: 4 /HPF
SOURCE AMPLIFICATION: NORMAL
SPECIFIC GRAVITY URINE: 1.04
SQUAMOUS EPITHELIAL CELLS: 3 /HPF
T VAGINALIS VAG QL WET PREP: NOT DETECTED
UROBILINOGEN URINE: NORMAL
WHITE BLOOD CELLS URINE: 4 /HPF

## 2021-03-04 LAB — BACTERIA UR CULT: NORMAL

## 2021-09-14 ENCOUNTER — APPOINTMENT (OUTPATIENT)
Dept: OBGYN | Facility: CLINIC | Age: 31
End: 2021-09-14
Payer: COMMERCIAL

## 2021-09-14 VITALS
OXYGEN SATURATION: 99 % | TEMPERATURE: 98.3 F | DIASTOLIC BLOOD PRESSURE: 89 MMHG | HEIGHT: 64 IN | SYSTOLIC BLOOD PRESSURE: 122 MMHG | BODY MASS INDEX: 25.61 KG/M2 | WEIGHT: 150 LBS | HEART RATE: 73 BPM

## 2021-09-14 DIAGNOSIS — R10.2 PELVIC AND PERINEAL PAIN: ICD-10-CM

## 2021-09-14 DIAGNOSIS — R87.810 ATYPICAL SQUAMOUS CELLS OF UNDETERMINED SIGNIFICANCE ON CYTOLOGIC SMEAR OF CERVIX (ASC-US): ICD-10-CM

## 2021-09-14 DIAGNOSIS — R87.610 ATYPICAL SQUAMOUS CELLS OF UNDETERMINED SIGNIFICANCE ON CYTOLOGIC SMEAR OF CERVIX (ASC-US): ICD-10-CM

## 2021-09-14 PROCEDURE — 99214 OFFICE O/P EST MOD 30 MIN: CPT

## 2021-09-14 NOTE — PHYSICAL EXAM
[Appropriately responsive] : appropriately responsive [Alert] : alert [No Acute Distress] : no acute distress [No Lymphadenopathy] : no lymphadenopathy [Regular Rate Rhythm] : regular rate rhythm [No Murmurs] : no murmurs [Clear to Auscultation B/L] : clear to auscultation bilaterally [Soft] : soft [Non-tender] : non-tender [Non-distended] : non-distended [No HSM] : No HSM [No Lesions] : no lesions [No Mass] : no mass [Oriented x3] : oriented x3 [Examination Of The Breasts] : a normal appearance [No Masses] : no breast masses were palpable [Discharge] : discharge [Scant] : scant [Foul Smelling] : not foul smelling [White] : white [Thick] : thick [IUD String] : an IUD string was noted [Normal] : normal [Normal Position] : in a normal position [Uterine Adnexae] : normal

## 2021-09-14 NOTE — HISTORY OF PRESENT ILLNESS
[Localized] : localized [[place]] : intercourse [TextBox_10] : dull pain during sex [Normal Amount/Duration] :  normal amount and duration [Regular Cycle Intervals] : periods have been regular [Menarche Age: ____] : age at menarche was [unfilled] [FreeTextEntry1] : 09/09/2021 [Currently Active] : currently active [Men] : men [Vaginal] : vaginal [No] : No

## 2021-09-15 LAB
APPEARANCE: ABNORMAL
BACTERIA: ABNORMAL
BILIRUBIN URINE: NEGATIVE
BLOOD URINE: ABNORMAL
C TRACH RRNA SPEC QL NAA+PROBE: NOT DETECTED
COLOR: NORMAL
GLUCOSE QUALITATIVE U: NEGATIVE
HPV HIGH+LOW RISK DNA PNL CVX: NOT DETECTED
HYALINE CASTS: 3 /LPF
KETONES URINE: NEGATIVE
LEUKOCYTE ESTERASE URINE: NEGATIVE
MICROSCOPIC-UA: NORMAL
N GONORRHOEA RRNA SPEC QL NAA+PROBE: NOT DETECTED
NITRITE URINE: NEGATIVE
PH URINE: 6
PROTEIN URINE: NORMAL
RED BLOOD CELLS URINE: 9 /HPF
SOURCE TP AMPLIFICATION: NORMAL
SPECIFIC GRAVITY URINE: 1.02
SQUAMOUS EPITHELIAL CELLS: 8 /HPF
UROBILINOGEN URINE: NORMAL
WHITE BLOOD CELLS URINE: 12 /HPF

## 2021-09-16 LAB — BACTERIA UR CULT: NORMAL

## 2021-09-16 RX ORDER — FLUCONAZOLE 150 MG/1
150 TABLET ORAL
Qty: 2 | Refills: 0 | Status: COMPLETED | COMMUNITY
Start: 2021-08-03

## 2021-09-20 LAB — CYTOLOGY CVX/VAG DOC THIN PREP: NORMAL

## 2022-11-01 NOTE — H&P PST ADULT - NSCAFFEINEWITH_GEN_ALL_CORE_SD
OCCUPATIONAL THERAPY DAILY NOTE  [address] THERAPY   Kira Marsh [address] [place] [address]  Dept: [address] [place] [address] [address]: [phone]   Rustam Coyne OT Fax: [phone]    Date:  2022  Initial Evaluation Date: 10/18/22      Patient Name:  Reno Quinonez    :  1943    Restrictions/Precautions:   Activity as tolerated,  Low fall risk, Poor STM  Diagnosis:  S52.591A (ICD-10-CM) - Other closed fracture of distal end of right radius, initial encounter                        Date of Surgery/Injury: R wrist ORIF 72     Insurance/Certification information:  Medicare/ [facility]  Plan of care signed (Y/N): N  Visit# / total visits:      Referring Practitioner:  Dr Jorden Justin Practitioner Orders: OT evaluate and treat     Assessment of current deficits   [] Functional mobility             [x] ADLs           [x] Strength                  [] Cognition   [] Functional transfers           [x] IADLs          [] Safety Awareness  [] Endurance   [] Fine Motor Coordination    [] Balance      [] Vision/perception    [] Sensation     [] Gross Motor Coordination [x] ROM           [x] Pain                        [x] Edema          [x] Scar Adhesion/Skin Integrity      OT PLAN OF CARE   OT POC based on physician orders, patient diagnosis and results of clinical assessment     Frequency/Duration: 2x/ week x 6 weeks  Specific OT Treatment to include:      [x] Instruction in HEP                   Modalities:  [x] Therapeutic Exercise                 [x] Ultrasound               [] Electrical Stimulation/Attended  [x] PROM/Stretching                    [x] Fluidotherapy          [x]  Paraffin                   [x] AAROM  [x] AROM                 [] Iontophoresis:   [] Tendon Glides                                               [] Neuromuscular Re-Ed            [] ADL/IADL re-training    [x] Therapeutic Activity                  [x] Pain Management with/without modalities PRN                 [x] Manual Therapy                      [] Splinting                                   [x] Scar Management                   []Joint Protection/Training  []Ergonomics                             [] Joint Mobilization                      [] Adaptive Equipment Assessment/Training                             [x] Manual Edema Mobilization   [] Myofascial Release                 [] Energy Conservation/Work Simplification  [] GM/FM Coordination                [] Safety retraining/education per  individual diagnosis/goals  [] Desensitization        Patient Specific Goal: pt wants to get better so she can get back to doing everything                             GOALS (Long term same as Short term):  1) Patient will demonstrate good understanding of home program (exercises/activities/diagnosis/prognosis/goals) with good accuracy. 2) Patient will demonstrate increased active/passive range of motion of their R wrist/ hand to [facility] for ADL/IADL completion. 3) Patient will demonstrate increased /pinch strength of at least 10 / 3 pinch pounds of their R hand. 4) Patient to report decreased pain in their affected R distal upper extremity from 8-9/10 to 3/10 or less with resistive functional use. 5) Patient will demonstrate a non-tender/non-adherent scar. 6) Patient will report ADL/ IADL  functions as Mod I/I using the R wrist/ hand including resuming regular bathing/ grooming routine and being able to completed light cooking/ cleaning. Pain Level: mild soreness at the wrist with motion    Subjective: \"I can use my R hand to move the shifter on my car\"       Objective:  Updated POC to be completed by 10th visit or 11/18/22.     INTERVENTION: COMPLETED: SPECIFICS/COMMENTS:   Modality:     fluidotherapy x -10 mins to increase soft tissue mobility as well as decrease pain, AROM encouraged throughout modality    Paraffin bath   -10 mins    AROM:     wrist X  x  x -all planes  -jux-a-cisor 6 reps  -blue ball ex's- wrist flexion/extension and RD/UD- 15 reps each. digital X      X  X    x    x -opposition with pennies  -tendon glides  -in hand manipulation with pennies  -alt prehension with small beads. - in hand manipulation with small beads.   -towel scrunches  -exercise spheres- CW-20 reps CCW- 10 reps- more difficult. -\"bunchems\" deconstruction   AAROM:               PROM/Stretching:     wrist x -all planes- gentle   thumb x -all planes   digital x -all planes   Scar Mass/Edema Control:     Soft tissue massage x -to increase pain free ROM and increase soft tissue mobility   Retrograde massage x -to decrease edema    Strengthening:               Other:                 Assessment/Comments: Pt is making Fair + progress toward stated plan of care. Pt. Tolerated all exercises and stretches today. Will continue to focus on AROM and prehension next session.     -Rehab Potential: Good  -Requires OT Follow Up: Yes    Time In: 1305          Time Out: 1400            Visit #: 4    Treatment Charges: Mins Units   Modalities-fluido 10 1   Ther Exercise 15 1   Manual Therapy 15 1   Thera Activities 15 1   ADL/Home Mgt      Neuro Re-education     Gait Training     Group Therapy     Non-Billable Service Time     Other     Total Time/Units 55 4       -Response to Treatment: tolerated session well  Goals: Goals for pt can be see on initial eval 10-18-22    Plan:   [x]  Continues Plan of care: Treatment covered based on POC and graduated to patient's progress. Pt education continues at each visit to obtain maximum benefits from skilled OT intervention.   []  [facility] of care:   []  Discharge:      Raelyn Kanner, OTA/RADAMES [id number] denies

## 2022-11-17 NOTE — DISCHARGE NOTE OB - NSCORESITESY/N_GEN_A_CORE_RD
[FreeTextEntry1] : pt advised to come into office tomorrow if he has no relief of symptoms \par prednisone 40mg x5 days \par continue with nebs\par add symbicort for maintenance of daily asthma symptoms \par ER precautions provided 
No

## 2023-02-17 NOTE — OB RN TRIAGE NOTE - NS_DCDISPOSITION_OBGYN_ALL_OB
[FreeTextEntry1] : medically stable\par check labs\par ekg ok\par check bp at home\par due for colonscopy\par refused all vaccines
Home

## 2023-06-10 NOTE — OB RN PATIENT PROFILE - NS PRO ABUSE SCREEN SUSPICION NEGLECT YN
64 yo M with  PMH of ICH (2021) s/p trach and PEG, HTN, HLD, T2DM, CAD s/p stents, Recurrent C diff, BIBEMS from [facility] for abnormal labs. Patient non-verbal at baseline - limited history. Per NH chart  have a BUN greater than 200 and creatinine of 4.3 on outpatient labs. Outpatient CXR also w/ new L sided pleural effusion. In ED, patient was hypotensive with Afib with RVR, found to be in acure renal failure, anemic with elevated trops. He was started on Amiodarone GTT, PPI GTT, gastric lavage with coffee ground secretions, started on broad spectrum abx and admitted to MICU  While in MICU, found to be in DKA, s/p insulin drip, MICHELLE started on HD, acute anemia s/p PRBC transfusion, NSTEMI with downtrending trops, now downgraded to SDU.    Acute Renal Failure, started on HD 6/1  Fluid Overload  HAGMA  - non oliguric, RBUS without hydro   - started on HD 6/1 via R JACOB Venango   - Cr much improved s/p HD yesterday, now 1.9  f/u Nephro recs  - c/w bumex 2mg iv q12hr, HD per renal--> HD today   - c/w midodrine 10mg q8hr  - c/w sodium bicarb 1300mg PO TID  -  TTE 6/1 - EF 63%, GIDD   - nephrology following     Candida tropicalis UTI  s/p cefepime and vanc   Blood clts neg, URine with Candida tropicalis   on  fluconazole, ID following can change from IV to PO via gtube     Acute anemia  - HgB 6.7 (baseline 8) s/p 2U pRBC - improved to 9.9  - evaluated by GI - no evidence of GI bleed (PEG tube flushed with return of bile and no blood)  - Monitor H/H closely  - Active T+S    Hyponatremia, likely hypervolemic  - c/w bumex 2mg iv q12hr and HD per nephro  - daily BMP    Afib w/ RVR - now rate controlled   - currently rate controlled s/p amio gtt  - change amio to 200 q24H     CAD s/p stents  NSTEMI  - Trops 1.80-->1.63-->1.57-->1.55  - seen by cardio,  c/w asa and statin, medical management     DKA, resolved  - s/p insulin drip, now on basal/bolus and tube feeds. Monitor FS     Hx ICH s/p trach and PEG  Bedbound from NH   - c/w keppra 500mg BID for seizure prophylaxis    DNR only, overall prognosis is poor     unable to assess

## 2023-07-26 NOTE — OB RN TRIAGE NOTE - NS_FETALMOVEMENTDETAILS_OBGYN_ALL_OB_FT
152 [FreeTextEntry1] : 55 year old man with persistent atrial fibrillation in the setting of hypertension and coronary artery disease.  He underwent CHRISTIANO guided DCCV 4/13/2023.  Post procedure on tele he demonstrated frequent APCs.  On 5/26/2023 he underwent electrophysiology testing and ablation (successful pulmonary vein antral isolation (LPV and RPV triggers), successful caval tricuspid isthmus ablation). For post ablation arrhythmia he was started on sotalol.  \par \par No complaints. no chest pain. No shortness of breath. NO palpitations.  He has resumed all of his normal activities. The groin is well healed. NO fevers/chills. \par \par

## 2024-01-25 ENCOUNTER — APPOINTMENT (OUTPATIENT)
Dept: ANTEPARTUM | Facility: CLINIC | Age: 34
End: 2024-01-25
Payer: COMMERCIAL

## 2024-01-25 ENCOUNTER — ASOB RESULT (OUTPATIENT)
Age: 34
End: 2024-01-25

## 2024-01-25 PROCEDURE — 76817 TRANSVAGINAL US OBSTETRIC: CPT

## 2024-09-11 ENCOUNTER — APPOINTMENT (OUTPATIENT)
Dept: ANTEPARTUM | Facility: CLINIC | Age: 34
End: 2024-09-11
Payer: COMMERCIAL

## 2024-09-11 ENCOUNTER — ASOB RESULT (OUTPATIENT)
Age: 34
End: 2024-09-11

## 2024-09-11 PROCEDURE — 76801 OB US < 14 WKS SINGLE FETUS: CPT

## 2024-09-11 PROCEDURE — 99204 OFFICE O/P NEW MOD 45 MIN: CPT | Mod: 25

## 2024-09-12 ENCOUNTER — APPOINTMENT (OUTPATIENT)
Dept: OBGYN | Facility: CLINIC | Age: 34
End: 2024-09-12
Payer: COMMERCIAL

## 2024-09-12 DIAGNOSIS — O34.219 UNSPECIFIED. ECTOPIC. PREGNANCY WITHOUT INTRAUTERINE PREGNANCY: ICD-10-CM

## 2024-09-12 DIAGNOSIS — O00.90 UNSPECIFIED. ECTOPIC. PREGNANCY WITHOUT INTRAUTERINE PREGNANCY: ICD-10-CM

## 2024-09-12 PROCEDURE — 99213 OFFICE O/P EST LOW 20 MIN: CPT

## 2024-09-12 NOTE — DISCUSSION/SUMMARY
[FreeTextEntry1] : 35 yo  @ ~9wks w/a  scar ectopic pregnancy w/placenta previa. Pt currently h-d stable. Clinical situation reviewed in detail with patient. She is well informed and well educated. We spoke about two step procedure to evacuate the  scar 1) UAE 2)D+C - possibly w/hysteroscopy and/or USG guidance. We also spoke about steps to take in the event of bleeding during the procedure. She is interested in future fertility, but understands that hysterectomy is a possibility. We also spoke about the option for a future surgical procedure to reconstruct the  niche. Questions answered.  Plan Will d/w IR Will try to schedule procedure for next week once we coordinate with IR  Letter to Dr. Cici Urbina  Addendum Case reviewed w/ Dr. Crain, IR. Will try to schedule UAE early next week. Plan would be to do the UAE immediately prior to [facility] w/possible D+C.

## 2024-09-12 NOTE — HISTORY OF PRESENT ILLNESS
[Home] : at home, [unfilled] , at the time of the visit. [Verbal consent obtained from patient] : the patient, [unfilled] [FreeTextEntry1] : -   33 yo  @ ~9wks w/a  scar ectopic pregnancy w/placenta previa. H/o c-s 2019.  3/2020 - D+C for termination 2020 Had second termination - scar pregnancy Trying to conceive  Missed ab 2024 --> D+C.  Pt interested in TTC again in the future. Pt understands that there is a chance that she will have a hysterectomy.

## 2024-09-17 ENCOUNTER — INPATIENT (INPATIENT)
Facility: HOSPITAL | Age: 34
LOS: 0 days | Discharge: HOME | End: 2024-09-18
Attending: OBSTETRICS & GYNECOLOGY | Admitting: OBSTETRICS & GYNECOLOGY
Payer: SELF-PAY

## 2024-09-17 VITALS
WEIGHT: 175.27 LBS | HEART RATE: 91 BPM | HEIGHT: 63 IN | SYSTOLIC BLOOD PRESSURE: 120 MMHG | DIASTOLIC BLOOD PRESSURE: 66 MMHG | OXYGEN SATURATION: 100 % | TEMPERATURE: 98 F | RESPIRATION RATE: 18 BRPM

## 2024-09-17 DIAGNOSIS — Z87.59 PERSONAL HISTORY OF OTHER COMPLICATIONS OF PREGNANCY, CHILDBIRTH AND THE PUERPERIUM: Chronic | ICD-10-CM

## 2024-09-17 DIAGNOSIS — O00.90 UNSPECIFIED ECTOPIC PREGNANCY WITHOUT INTRAUTERINE PREGNANCY: ICD-10-CM

## 2024-09-17 DIAGNOSIS — Z98.891 HISTORY OF UTERINE SCAR FROM PREVIOUS SURGERY: Chronic | ICD-10-CM

## 2024-09-17 RX ORDER — FLU VACCINE TS 2012-2013(5YR+) 45MCG/.5ML
0.5 VIAL (ML) INTRAMUSCULAR ONCE
Refills: 0 | Status: DISCONTINUED | OUTPATIENT
Start: 2024-09-17 | End: 2024-09-18

## 2024-09-17 NOTE — H&P ADULT - ASSESSMENT
A/P: 33 yo  LMP 24 @ 10w0d w/a  scar ectopic pregnancy w/ placenta previa and high likelihood of placenta accreta. Patient admitted for UAE prior to scheduled D&C resectoscope tomorrow AM. Currently asymptomatic.    Neuro: Currently without pain.  CV: Hemodynamically stable, f/u AM CBC  Pulm: Saturating well on room air, encourage oob/amb/IS  GI: NPO@MN for scheduled AM procedure  : Voiding spontaneously   - IR consulted for UAE tomorrow morning prior to scheduled D&C resectoscope  FEN: LR@125, f/u AM BMP/Mg/Phos  Heme: c/w SCDs for DVT ppx. f/u AM Coagulation studies/T&S  ID: Afebrile  Endo: No active issues     Dispo: Continue inpatient care    RUIZ Smith, PGY-2

## 2024-09-17 NOTE — H&P ADULT - NSHPLABSRESULTS_GEN_ALL_CORE
OBSTETRICS REPORT         (Signed Final 2024 02:17 pm)  ----------------------------------------------------------------------  Patient Info  ?   ID #:    [id number]           : [birth year] (34 yrs)(F)   Name:    ANSELMO CARLOS      Visit Date: 2024 10:46 am  ----------------------------------------------------------------------  Performed By  ?   Attending:    Cici Urbina MD   Performed By:   Nickie Bobo RDMS   Referred By:   Agueda Calero MD   Ref. Address:   [address] [place] [address] Phy.:  Archana Garcia RNC-OB   Location:     [facility]   Visit Type:    MFM  ----------------------------------------------------------------------  Service(s) Provided  ?   Ultrasound Transabdominal First Trimester Each   Gestation < 14 wks  ----------------------------------------------------------------------  Fetal Evaluation  ?   Num Of Fetuses:     1   Preg. Location:     C/S scar   Gest. Sac:       Visualized   Yolk Sac:        Visualized   Fetal Pole:       Visualized   Fetal Heart Rate(bpm): 176   Cardiac Activity:    Observed  ----------------------------------------------------------------------  Biometry  ?   CRL:   25.9 mm   G.Age:  9w 3d          OMERO:  25  ?  ----------------------------------------------------------------------  OB History   :  6     Term:  1    Mendoza:  0    SAB:  1   TOP:     2    Ectopic: 1    Livin  ----------------------------------------------------------------------  Gestational Age  ?   LMP:      9w 1d    Date: 24       OMERO:  4/15/25   Clinical OMERO: 9w 1d                 OMERO:  4/15/25   Best:     9w 1d  Det. By: LMP (24)  OMERO:  4/15/25  ----------------------------------------------------------------------  Cervix Uterus Adnexa  ?   Cervix   Within Normal Limits  ?   Uterus   Normal Shape and Contour  ?   Right Ovary   Not visualized  ?   Left Ovary   Size(cm)   2.61  x  1.71  x 1.79   Vol(ml): 4.18   Within Normal Limits  ?   Cul De Sac   There is No FF Visualized.  ?   Adnexa   Within Normal Limits  ----------------------------------------------------------------------  Myomas  ?   Site         L(cm)   W(cm)   D(cm)   Location   Posterior Right IM  5.4    4  ----------------------------------------------------------------------  ?   Blood Flow       RI    PI    Comments  ?  ----------------------------------------------------------------------  Comments   The patient presents for r/o scar ectopic and placenta   accreta.   ----------------------------------------------------------------------  Impression   A combination of transabdominal and transvaginal   sonogram was needed to evaluate the pregnancy due   to early gestational age and location of the pregnancy.   Placenta previa with sonographic findings suggest a   high likelihood of placenta accreta: There are multiple   intraplacental echolucencies and hypervascularity at   the placental myometrial interface. No vessels appear   to cross into the bladder.  ?   The uterine cavity and endocervical canal are empty,   and the endometrial and endocervical linings are   clearly visualized.   The gestational sac is embedded within the    scar. Fetal pole with cardiac activity is present.   Fetal CRL is consistent with early ultrasound.   Intramural posterior myoma .

## 2024-09-17 NOTE — PATIENT PROFILE ADULT - FUNCTIONAL ASSESSMENT - DAILY ACTIVITY 3.
Marymount 
4 = No assist / stand by assistance

## 2024-09-17 NOTE — H&P ADULT - NSHPPHYSICALEXAM_GEN_ALL_CORE
Vital Signs Last 24 Hrs  T(C): 36.7 (17 Sep 2024 21:32), Max: 36.7 (17 Sep 2024 21:32)  T(F): 98.1 (17 Sep 2024 21:32), Max: 98.1 (17 Sep 2024 21:32)  HR: 91 (17 Sep 2024 21:32) (91 - 91)  BP: 120/66 (17 Sep 2024 21:32) (120/66 - 120/66)  BP(mean): --  RR: 18 (17 Sep 2024 21:32) (18 - 18)  SpO2: 100% (17 Sep 2024 21:32) (100% - 100%)    Parameters below as of 17 Sep 2024 21:32  Patient On (Oxygen Delivery Method): room air        Physical Exam:   General: sitting comfortably in bed, NAD   CV: RR   Lungs: Normal respiratory effort on room air  Abd: Soft, non-tender, non-distended.  :  Deferred  Ext: non-tender b/l, no edema

## 2024-09-17 NOTE — PATIENT PROFILE ADULT - FUNCTIONAL ASSESSMENT - DAILY ACTIVITY ASSESSMENT TYPE
HR=60 bpm, EBDQ=063/89 mmhg, SpO2=97.0 %, Resp=12 B/min, EtCO2=35 mmHg, Apnea=5 Seconds, Pain=0, Foote=3 Admission

## 2024-09-17 NOTE — H&P ADULT - ATTENDING COMMENTS
Case reviewed w/GYN team.    33 yo  LMP 24 @ 10w0d w/a  scar ectopic pregnancy w/ placenta previa and high likelihood of placenta accreta. Patient admitted for UAE prior to scheduled D&C resectoscope.    Assessment and plan as above.  Will consult IR for UA prior to uterine evacuation.  Precautions reviewed in detail.

## 2024-09-17 NOTE — CHART NOTE - NSCHARTNOTEFT_GEN_A_CORE
PRE-INTERVENTIONAL RADIOLOGY PROCEDURE NOTE      Patient Age: 34    Patient Gender:  female    Procedure:  UAE    Diagnosis/Indication: CS scar ectopic pregnancy    Interventional Radiology Attending Physician: Paras    Ordering Attending Physician: Karla    Pertinent Medical History:  section x1, D&C x2    Pertinent labs: pending     Patient and Family Aware ? Yes

## 2024-09-17 NOTE — H&P ADULT - HISTORY OF PRESENT ILLNESS
ANSELMO IZAGUIRRE  34y  Female [id number]    A/P: 33 yo  LMP 24 @ 10w0d w/a  scar ectopic pregnancy w/ placenta previa and high likelihood of placenta accreta. Patient admitted for UAE prior to scheduled D&C resectoscope tomorrow AM. Currently asymptomatic.      POB:  FT C/S 2019. 3/2020 TOP s/p D+C. 2020 Had second termination for scar pregnancy. 2024 [facility] s/p D+C.    Pgyn: Denies fibroids, cysts, endometriosis, STI's, Abnormal pap smears   PMH: Denies  PSH: C/Sx1, D&Cx2  Meds: Denies  All: NKDA         ANSELMO IZAGUIRRE  34y  Female [id number]    A/P: 33 yo  LMP 24 @ 10w0d w/a  scar ectopic pregnancy w/ placenta previa and high likelihood of placenta accreta. Patient admitted for UAE prior to scheduled D&C resectoscope tomorrow AM. Currently asymptomatic.      POB:  FT C/S . 3/2020 TOP s/p D+C. 2020 Had second termination for scar pregnancy. 2024 [facility] s/p D+C.    Pgyn: Denies fibroids, cysts, endometriosis, STI's, Abnormal pap smears   PMH: Denies  PSH: C/Sx1, D&Cx2  Meds: Denies  All: Amoxicillin, penicillin, shellfish

## 2024-09-18 ENCOUNTER — TRANSCRIPTION ENCOUNTER (OUTPATIENT)
Age: 34
End: 2024-09-18

## 2024-09-18 ENCOUNTER — APPOINTMENT (OUTPATIENT)
Dept: OBGYN | Facility: HOSPITAL | Age: 34
End: 2024-09-18

## 2024-09-18 ENCOUNTER — RESULT REVIEW (OUTPATIENT)
Age: 34
End: 2024-09-18

## 2024-09-18 VITALS
RESPIRATION RATE: 18 BRPM | SYSTOLIC BLOOD PRESSURE: 106 MMHG | HEART RATE: 89 BPM | DIASTOLIC BLOOD PRESSURE: 75 MMHG | OXYGEN SATURATION: 99 % | TEMPERATURE: 98 F

## 2024-09-18 LAB
ANION GAP SERPL CALC-SCNC: 14 MMOL/L — SIGNIFICANT CHANGE UP (ref 7–14)
APTT BLD: 25.1 SEC — SIGNIFICANT CHANGE UP (ref 24.5–35.6)
BLD GP AB SCN SERPL QL: NEGATIVE — SIGNIFICANT CHANGE UP
BLD GP AB SCN SERPL QL: NEGATIVE — SIGNIFICANT CHANGE UP
BUN SERPL-MCNC: 7 MG/DL — SIGNIFICANT CHANGE UP (ref 7–23)
CALCIUM SERPL-MCNC: 8.9 MG/DL — SIGNIFICANT CHANGE UP (ref 8.4–10.5)
CHLORIDE SERPL-SCNC: 104 MMOL/L — SIGNIFICANT CHANGE UP (ref 98–107)
CO2 SERPL-SCNC: 19 MMOL/L — LOW (ref 22–31)
CREAT SERPL-MCNC: 0.36 MG/DL — LOW (ref 0.5–1.3)
EGFR: 136 ML/MIN/1.73M2 — SIGNIFICANT CHANGE UP
GLUCOSE SERPL-MCNC: 91 MG/DL — SIGNIFICANT CHANGE UP (ref 70–99)
HCT VFR BLD CALC: 35.6 % — SIGNIFICANT CHANGE UP (ref 34.5–45)
HGB BLD-MCNC: 11.8 G/DL — SIGNIFICANT CHANGE UP (ref 11.5–15.5)
INR BLD: 0.94 RATIO — SIGNIFICANT CHANGE UP (ref 0.85–1.18)
MAGNESIUM SERPL-MCNC: 1.9 MG/DL — SIGNIFICANT CHANGE UP (ref 1.6–2.6)
MCHC RBC-ENTMCNC: 27.8 PG — SIGNIFICANT CHANGE UP (ref 27–34)
MCHC RBC-ENTMCNC: 33.1 GM/DL — SIGNIFICANT CHANGE UP (ref 32–36)
MCV RBC AUTO: 83.8 FL — SIGNIFICANT CHANGE UP (ref 80–100)
NRBC # BLD: 0 /100 WBCS — SIGNIFICANT CHANGE UP (ref 0–0)
NRBC # FLD: 0 K/UL — SIGNIFICANT CHANGE UP (ref 0–0)
PHOSPHATE SERPL-MCNC: 3.2 MG/DL — SIGNIFICANT CHANGE UP (ref 2.5–4.5)
PLATELET # BLD AUTO: 284 K/UL — SIGNIFICANT CHANGE UP (ref 150–400)
POTASSIUM SERPL-MCNC: 3.4 MMOL/L — LOW (ref 3.5–5.3)
POTASSIUM SERPL-SCNC: 3.4 MMOL/L — LOW (ref 3.5–5.3)
PROTHROM AB SERPL-ACNC: 10.6 SEC — SIGNIFICANT CHANGE UP (ref 9.5–13)
RBC # BLD: 4.25 M/UL — SIGNIFICANT CHANGE UP (ref 3.8–5.2)
RBC # FLD: 13.8 % — SIGNIFICANT CHANGE UP (ref 10.3–14.5)
RH IG SCN BLD-IMP: POSITIVE — SIGNIFICANT CHANGE UP
RH IG SCN BLD-IMP: POSITIVE — SIGNIFICANT CHANGE UP
SODIUM SERPL-SCNC: 137 MMOL/L — SIGNIFICANT CHANGE UP (ref 135–145)
WBC # BLD: 10.74 K/UL — HIGH (ref 3.8–10.5)
WBC # FLD AUTO: 10.74 K/UL — HIGH (ref 3.8–10.5)

## 2024-09-18 PROCEDURE — 58120 DILATION AND CURETTAGE: CPT | Mod: 22

## 2024-09-18 PROCEDURE — 88305 TISSUE EXAM BY PATHOLOGIST: CPT | Mod: 26

## 2024-09-18 PROCEDURE — 37242 VASC EMBOLIZE/OCCLUDE ARTERY: CPT

## 2024-09-18 PROCEDURE — 58558 HYSTEROSCOPY BIOPSY: CPT

## 2024-09-18 PROCEDURE — 36247 INS CATH ABD/L-EXT ART 3RD: CPT | Mod: 50

## 2024-09-18 RX ORDER — ACETAMINOPHEN 325 MG/1
3 TABLET ORAL
Qty: 0 | Refills: 0 | DISCHARGE

## 2024-09-18 RX ORDER — IBUPROFEN 600 MG
3 TABLET ORAL
Qty: 0 | Refills: 0 | DISCHARGE

## 2024-09-18 RX ORDER — OXYCODONE HYDROCHLORIDE 5 MG/1
1 TABLET ORAL
Qty: 5 | Refills: 0
Start: 2024-09-18

## 2024-09-18 RX ORDER — HYDROMORPHONE HYDROCHLORIDE 2 MG/1
0.25 TABLET ORAL
Refills: 0 | Status: DISCONTINUED | OUTPATIENT
Start: 2024-09-18 | End: 2024-09-18

## 2024-09-18 RX ORDER — HYDROMORPHONE HYDROCHLORIDE 2 MG/1
0.5 TABLET ORAL
Refills: 0 | Status: DISCONTINUED | OUTPATIENT
Start: 2024-09-18 | End: 2024-09-18

## 2024-09-18 RX ORDER — METHYLERGONOVINE MALEATE 0.2 MG/1
1 TABLET ORAL
Qty: 5 | Refills: 0
Start: 2024-09-18

## 2024-09-18 RX ORDER — ONDANSETRON 2 MG/ML
4 INJECTION, SOLUTION INTRAMUSCULAR; INTRAVENOUS ONCE
Refills: 0 | Status: DISCONTINUED | OUTPATIENT
Start: 2024-09-18 | End: 2024-09-18

## 2024-09-18 RX ADMIN — Medication 125 MILLILITER(S): at 18:45

## 2024-09-18 RX ADMIN — Medication 200 MILLIGRAM(S): at 16:42

## 2024-09-18 NOTE — CHART NOTE - NSCHARTNOTEFT_GEN_A_CORE
R2 OBGYN POST-OP CHECK    S: Patient seen and evaluated at bedside.  Pt awake and alert resting comfortably in bed with  at bedside.  Patient reports pain controlled with analgesia. Pt denies lightheadedness/dizziness, N/V, SOB, CP, palpitations, fever/chills. Tolerating clears. Not OOB yet.    O:   T(C): 36.6 (24 @ 20:15), Max: 36.6 (24 @ 20:00)  HR: 89 (24 @ 20:15) (76 - 96)  BP: 106/75 (24 @ 20:15) (106/62 - 120/76)  RR: 18 (24 @ 20:15) (11 - 18)  SpO2: 99% (24 @ 20:15) (99% - 100%)  Wt(kg): --  I&O's Summary    18 Sep 2024 07:01  -  18 Sep 2024 21:36  --------------------------------------------------------  IN: 450 mL / OUT: 1300 mL / NET: -850 mL        Gen: Resting comfortably in bed, NAD  CV: RRR  Lungs: CTA B/L  Abd: Soft, appropriately tender, occasional BS x 4 quadrants.    : Minimal spotting on pad  Ext: SCD's in place and functional, non-tender b/l, no edema        A/P: 34y Female s/p diagnostic hysteroscopy with dilation and curettage for  scan ectopic. Patient received UAE prior to scheduled surgery and received IM Methergine prophylactically. . Patient recovering appropriately.     Neuro: PO Analgesia PRN    CV: Hemodynamically stable.  Monitor VS.   Pulm: Saturating well on room air.  Encourage OOB and incentive spirometer use.   GI: Advance to regular diet. Anti-emetics PRN.  : Voiding spontaneously   FEN: SLIV  Heme: DVT ppx w/ SCD's while in bed. Early ambulation, initially with assistance then as tolerated.   - Patient sent prescription for Methergine series to complete, medication at bedside  ID: Afebrile  Endo: No active issues     Dispo: Discharge from PACU when criteria met.     d/w Dr. Gaurav MESA Fellow  RUIZ Smith, PGY-2

## 2024-09-18 NOTE — ASU DISCHARGE PLAN (ADULT/PEDIATRIC) - NURSING INSTRUCTIONS
Tylenol due now if needed and every 6 hours   Motrin due now if needed every 6 hours can alternate so  that you are getting a pain med every 3 hours

## 2024-09-18 NOTE — DISCHARGE NOTE PROVIDER - NSDCMRMEDTOKEN_GEN_ALL_CORE_FT
pt. denies current medication use:    ibuprofen 200 mg oral tablet: 3 tab(s) orally every 6 hours  Methergine 0.2 mg oral tablet: 1 tab(s) orally every 6 hours Take first tablet at 10:30 pm on 9/18, then take one tablet every 4 hours after that MDD: 6 tabs  oxyCODONE 5 mg oral tablet: 1 tab(s) orally every 6 hours as needed for  severe pain MDD: 4 tablets  Tylenol 325 mg oral tablet: 3 tab(s) orally every 6 hours

## 2024-09-18 NOTE — PRE PROCEDURE NOTE - PRE PROCEDURE EVALUATION
Interventional Radiology    HPI: 34y Female with  scar ectopic pregnancy and high likelihood of placenta accreta presenting for uterine artery embolization prior to D&C.     Allergies: penicillin (Hives)  amoxicillin (Hives)    Medications (Abx/Cardiac/Anticoagulation/Blood Products)      Data:  160  79.5  T(C): 36.8  HR: 75  BP: 117/74  RR: 17  SpO2: 100%    Exam  General: No acute distress  Chest: Non labored breathing  Abdomen: Non-distended  Extremities: No swelling, warm    -WBC 10.74 / HgB 11.8 / Hct 35.6 / Plt 284  -Na 137 / Cl 104 / BUN 7 / Glucose 91  -K 3.4 / CO2 19 / Cr 0.36  -ALT -- / Alk Phos -- / T.Bili --  -INR0.94    Plan:   34y Female with  scar ectopic pregnancy and high likelihood of placenta accreta presenting for uterine artery embolization prior to D&C.   -- Relevant imaging and labs were reviewed.   -- Risks, benefits, and alternatives were explained to the patient and informed consent was obtained.   Interventional Radiology    HPI: 34y Female with  scar ectopic pregnancy and high likelihood of placenta accreta presenting for uterine artery embolization prior to D&C.     Allergies: penicillin (Hives)  amoxicillin (Hives)    Medications (Abx/Cardiac/Anticoagulation/Blood Products)      Data:  160  79.5  T(C): 36.8  HR: 75  BP: 117/74  RR: 17  SpO2: 100%    Exam  General: No acute distress  Chest: Non labored breathing  Abdomen: Non-distended  Extremities: No swelling, warm    -WBC 10.74 / HgB 11.8 / Hct 35.6 / Plt 284  -Na 137 / Cl 104 / BUN 7 / Glucose 91  -K 3.4 / CO2 19 / Cr 0.36  -ALT -- / Alk Phos -- / T.Bili --  -INR0.94    Plan:   34y Female with  scar ectopic pregnancy and high likelihood of placenta previa possible accreta presenting for uterine artery embolization prior to D&C.   -- Relevant imaging and labs were reviewed.   -- Risks, benefits, and alternatives were explained to the patient and informed consent was obtained.

## 2024-09-18 NOTE — DISCHARGE NOTE PROVIDER - HOSPITAL COURSE
33 yo  LMP 24 @ 10w0d presented with a  scar ectopic pregnancy w/ placenta previa and high likelihood of placenta accreta. Patient admitted for UAE prior to scheduled D&C resectoscope. On , patient underwent a UAE and subsequently D&C resectoscope. Procedure was uncomplicated. Patient was transferred to recovery room in stable condition. Please see operative note for full details.      ***  On POD #0, Ruiz catheter was discontinued and patient voided without issues. Patient's diet was advanced and she tolerated regular diet without issues.  On POD#1, pt was discharged in stable condition, ambulating, tolerating po and voiding spontaneously. Labs drawn post-operatively and on POD#1 were stable compared to pre-op.     POD#2 routine post-operative care was performed.  No notable events.   On POD#3 patient was deemed stable for discharge as she was meeting postoperative milestones - tolerating regular diet, ambulating without difficulty, voiding, and pain was well controlled on oral medications. Throughout admission, patient received prophylactic anticoagulation.  Patient was instructed to follow up with  [X] in 2 weeks. 35 yo  LMP 24 @ 10w0d with recently diagnosed  scar ectopic pregnancy w/ placenta previa and concern for placenta accreta on ultrasound imaging. Patient admitted for UAE prior to scheduled D&C Resectoscope. On , patient underwent a UAE followed by Diagnostic Hysteroscopy, suction D&C under ultrasound guidance. Procedure was uncomplicated with an  cc. Patient received prophylactic Methergine 0.2 mg IM at close of case. Patient was transferred to recovery room in stable condition. Please see operative note for full details.     On POD #0, patient was evaluated in the PACU. She was ambulating, tolerating PO, and voiding. She was hemodynamically stable with minimal vaginal bleeding and pain well controlled. The patient was deemed stable for discharge as she was meeting postoperative milestones. Patient will follow up with Dr. Acosta in 2 weeks.

## 2024-09-18 NOTE — DISCHARGE NOTE PROVIDER - NSDCCPTREATMENT_GEN_ALL_CORE_FT
PRINCIPAL PROCEDURE  Procedure: Diagnostic hysteroscopy with dilation and curettage of uterus  Findings and Treatment: D&C using suction under ultrasound guidance      SECONDARY PROCEDURE  Procedure: Uterine artery embolization  Findings and Treatment:

## 2024-09-18 NOTE — BRIEF OPERATIVE NOTE - NSICDXBRIEFPROCEDURE_GEN_ALL_CORE_FT
No
PROCEDURES:  Diagnostic hysteroscopy with dilation and curettage of uterus 18-Sep-2024 19:04:06 Suction D&C under ultrasound guidance Karen Saunders

## 2024-09-18 NOTE — DISCHARGE NOTE PROVIDER - CARE PROVIDER_API CALL
Aram Acosta  Obstetrics and Gynecology  [address] [place] [address]  Phone: [phone]  Fax: [phone]  Follow Up Time: 2 weeks

## 2024-09-18 NOTE — BRIEF OPERATIVE NOTE - NSICDXBRIEFPOSTOP_GEN_ALL_CORE_FT
POST-OP DIAGNOSIS:   scar ectopic pregnancy 18-Sep-2024 19:06:10  Karen Saunders  Placenta previa antepartum 18-Sep-2024 19:05:38  Karen Saunders

## 2024-09-18 NOTE — ASU PREOP CHECKLIST - 3.
s/p IR procedure; embolization - pt presents with placenta accreta. left radial artery band clean/dry/intact s/p IR procedure; uterine artery embolization. left radial artery band clean/dry/intact

## 2024-09-18 NOTE — PROGRESS NOTE ADULT - SUBJECTIVE AND OBJECTIVE BOX
Gyn Progress Note     SUBJECTIVE:   Pt seen and examined at bedside. No events overnight. Denies pain or vaginal bleeding. Patient ambulating. Passing flatus. NPO. Pt denies fever, chills, chest pain, SOB, nausea, vomiting, lightheadedness, dizziness.      PHYSICAL EXAM:   T(F): 97.6 (09-18-24 @ 05:52), Max: 98.1 (09-17-24 @ 21:32)  HR: 74 (09-18-24 @ 05:52) (74 - 91)  BP: 120/67 (09-18-24 @ 05:52) (110/61 - 120/67)  RR: 18 (09-18-24 @ 05:52) (18 - 18)  SpO2: 99% (09-18-24 @ 05:52) (98% - 100%)  Wt(kg): --    Constitutional: NAD, A+O x3  CV: well perfused   Lungs: normal respirations  Abdomen: Soft, nondistended, no guarding or rebound tenderness. Normal bowel sounds  : No bleeding on pad  Extremities: No lower extremity edema or calf tenderness bilaterally; venodynes in place      LABS:      PT/INR - ( 18 Sep 2024 04:17 )   PT: 10.6 sec;   INR: 0.94 ratio         PTT - ( 18 Sep 2024 04:17 )  PTT:25.1 sec    CAPILLARY BLOOD GLUCOSE          I&O's Summary      MEDICATIONS  (STANDING):  influenza   Vaccine 0.5 milliLiter(s) IntraMuscular once  lactated ringers. 1000 milliLiter(s) (125 mL/Hr) IV Continuous <Continuous>

## 2024-09-18 NOTE — BRIEF OPERATIVE NOTE - NSICDXBRIEFPREOP_GEN_ALL_CORE_FT
PRE-OP DIAGNOSIS:  Placenta accreta without hemorrhage 18-Sep-2024 19:05:58  Karen Saunders   scar ectopic pregnancy 18-Sep-2024 19:05:08  Karen Saunders

## 2024-09-18 NOTE — PROCEDURE NOTE - PROCEDURE FINDINGS AND DETAILS
Successful bilateral uterine artery embolization with Avitene via a left radial artery artery approach.  Patient tolerated the procedure well with no immediate complication.   Left wrist hemostasis achieved with TR band.  Plan for overnight admission for pain control as needed, vs. discharge home after a few hours.  Keep TR band inflated for 60 minutes. After 60 minutes, deflate 3 mL every 15 minutes. If bleeding, reinflate by 3 mL and again deflate in another 15 minutes.  Total volume of air injected in the TR band at baseline is currently 8mL.

## 2024-09-18 NOTE — CONSULT NOTE ADULT - SUBJECTIVE AND OBJECTIVE BOX
Brief Consult note    33 yo  LMP 24 @ 10w0d w/a  scar ectopic pregnancy w/ placenta previa and high likelihood of placenta accreta.  Patient currently asymptomatic.     Consult for IR UAE today prior to D&C today.    Case reviewed in samuel, planned for IR UAE this morning prior to OR.    IR procedure order already placed.

## 2024-09-18 NOTE — DISCHARGE NOTE PROVIDER - NSDCFUADDINST_GEN_ALL_CORE_FT
Return to your regular way of eating. Resume normal activity as tolerated, but no heavy lifting or strenuous activity for 6 weeks. Complete vaginal rest, no tampons, no douching, no tub bathing, no sexual activities for 6 weeks unless otherwise instructed by your doctor. Expect to have vaginal bleeding/spotting that will get lighter over time. For bleeding soaking through more than two pads per hour for two consecutive hours, or for passing clots greater than the size of your fist, call your doctor or come to the emergency department. Call your doctor with any signs or symptoms of infection such as fever, chills, nausea or vomiting. Call your doctor if you're unable to tolerate food or have difficulty urinating. Call your doctor if you have pain that is not relieved by medications.  Notify your doctor with any other concerns. Follow up with Dr. Acosta in 2 weeks. Call the office to schedule an appointment if you do not already have one.    Take Methergine 0.2 mg every 4 hours for a total of 5 doses. Start this at 10:30 pm on 9/18/24. This is to prevent uterine bleeding.    You can take up to 600 mg Ibuprofen every 6 hours and/or 975 mg Tylenol every 6 hours for pain. You can take Oxycodone 5 mg every 6 hours as needed for severe pain.

## 2024-09-18 NOTE — DISCHARGE NOTE PROVIDER - NSDCCPCAREPLAN_GEN_ALL_CORE_FT
PRINCIPAL DISCHARGE DIAGNOSIS  Diagnosis:  scar ectopic pregnancy  Assessment and Plan of Treatment:

## 2024-09-18 NOTE — BRIEF OPERATIVE NOTE - OPERATION/FINDINGS
Exam under anesthesia revealed normal external female genitalia. Uterus 12 week size, mobile, cervix closed. Diagnostic hysteroscopy revealed gestational sac and chorionic sac within the endometrial cavity. Unable to appreciate gestational sac growing into anterior uterine myometrium. Suction D&C with return of tissue. Post-D&C diagnostic hysteroscopy revealed no remnants of chorionic villi or other pregnancy tissue in the cavity. Notable defect in anterior myometrium at area of  scar. Excellent hemostasis. Methergine 0.2 mg IM given at close of case for prophylaxis.

## 2024-09-18 NOTE — ASU DISCHARGE PLAN (ADULT/PEDIATRIC) - CARE PROVIDER_API CALL
Aram Acosta  Obstetrics and Gynecology  [address] [place] [address]  Phone: [phone]  Fax: [phone]  Follow Up Time: 2 weeks

## 2024-09-18 NOTE — PROGRESS NOTE ADULT - ASSESSMENT
Assessment/Plan: 33 yo  LMP 24 @10w1d w/a  scar ectopic pregnancy w/ placenta previa and high likelihood of placenta accreta. Patient admitted for management, planning for IR UAE at 10am with D&C resectoscope after at 12:30p.    Neuro: IV Tylenol PRN  CV: Hemodynamically stable  Pulm: Saturating well on room air. Encourage ambulation.   GI: NPO in preparation for OR  : Voiding spontaneously.   - For IR UAE at 10am   - For OR w/ Dr. Payne at 12:30p for D&C w/ resectoscope   Heme: Venodynes for DVT ppx. Increase OOB.    FEN: LR@125  ID: Afebrile  Endo: No active issues  Dispo: Continue inpatient management     TIFFANI العلي  Seen w/ GYN team

## 2024-09-19 LAB — GLUCOSE BLDC GLUCOMTR-MCNC: 95 MG/DL — SIGNIFICANT CHANGE UP (ref 70–99)

## 2024-09-24 LAB — SURGICAL PATHOLOGY STUDY: SIGNIFICANT CHANGE UP

## 2024-10-10 ENCOUNTER — APPOINTMENT (OUTPATIENT)
Dept: OBGYN | Facility: CLINIC | Age: 34
End: 2024-10-10
Payer: COMMERCIAL

## 2024-10-10 VITALS
WEIGHT: 169 LBS | SYSTOLIC BLOOD PRESSURE: 112 MMHG | HEART RATE: 71 BPM | BODY MASS INDEX: 28.85 KG/M2 | HEIGHT: 64 IN | DIASTOLIC BLOOD PRESSURE: 75 MMHG

## 2024-10-10 DIAGNOSIS — D25.9 LEIOMYOMA OF UTERUS, UNSPECIFIED: ICD-10-CM

## 2024-10-10 DIAGNOSIS — N85.A ISTHMOCELE: ICD-10-CM

## 2024-10-10 PROCEDURE — 99214 OFFICE O/P EST MOD 30 MIN: CPT

## 2025-01-10 ENCOUNTER — OUTPATIENT (OUTPATIENT)
Dept: OUTPATIENT SERVICES | Facility: HOSPITAL | Age: 35
LOS: 1 days | End: 2025-01-10

## 2025-01-10 VITALS
SYSTOLIC BLOOD PRESSURE: 130 MMHG | HEART RATE: 72 BPM | WEIGHT: 166.89 LBS | OXYGEN SATURATION: 98 % | HEIGHT: 63 IN | DIASTOLIC BLOOD PRESSURE: 89 MMHG | TEMPERATURE: 98 F | RESPIRATION RATE: 16 BRPM

## 2025-01-10 DIAGNOSIS — Z98.890 OTHER SPECIFIED POSTPROCEDURAL STATES: Chronic | ICD-10-CM

## 2025-01-10 DIAGNOSIS — Z87.59 PERSONAL HISTORY OF OTHER COMPLICATIONS OF PREGNANCY, CHILDBIRTH AND THE PUERPERIUM: Chronic | ICD-10-CM

## 2025-01-10 DIAGNOSIS — D25.9 LEIOMYOMA OF UTERUS, UNSPECIFIED: ICD-10-CM

## 2025-01-10 DIAGNOSIS — Z98.891 HISTORY OF UTERINE SCAR FROM PREVIOUS SURGERY: Chronic | ICD-10-CM

## 2025-01-10 DIAGNOSIS — N85.A ISTHMOCELE: ICD-10-CM

## 2025-01-10 DIAGNOSIS — Z33.2 ENCOUNTER FOR ELECTIVE TERMINATION OF PREGNANCY: Chronic | ICD-10-CM

## 2025-01-10 LAB
BASOPHILS # BLD AUTO: 0.06 K/UL — SIGNIFICANT CHANGE UP (ref 0–0.2)
BASOPHILS NFR BLD AUTO: 0.6 % — SIGNIFICANT CHANGE UP (ref 0–2)
BLD GP AB SCN SERPL QL: NEGATIVE — SIGNIFICANT CHANGE UP
EOSINOPHIL # BLD AUTO: 0.14 K/UL — SIGNIFICANT CHANGE UP (ref 0–0.5)
EOSINOPHIL NFR BLD AUTO: 1.4 % — SIGNIFICANT CHANGE UP (ref 0–6)
HCG SERPL-ACNC: <1 MIU/ML — SIGNIFICANT CHANGE UP
HCT VFR BLD CALC: 41.7 % — SIGNIFICANT CHANGE UP (ref 34.5–45)
HGB BLD-MCNC: 13.7 G/DL — SIGNIFICANT CHANGE UP (ref 11.5–15.5)
IMM GRANULOCYTES NFR BLD AUTO: 0.3 % — SIGNIFICANT CHANGE UP (ref 0–0.9)
LYMPHOCYTES # BLD AUTO: 3.64 K/UL — HIGH (ref 1–3.3)
LYMPHOCYTES # BLD AUTO: 35.2 % — SIGNIFICANT CHANGE UP (ref 13–44)
MCHC RBC-ENTMCNC: 27.2 PG — SIGNIFICANT CHANGE UP (ref 27–34)
MCHC RBC-ENTMCNC: 32.9 G/DL — SIGNIFICANT CHANGE UP (ref 32–36)
MCV RBC AUTO: 82.9 FL — SIGNIFICANT CHANGE UP (ref 80–100)
MONOCYTES # BLD AUTO: 0.72 K/UL — SIGNIFICANT CHANGE UP (ref 0–0.9)
MONOCYTES NFR BLD AUTO: 7 % — SIGNIFICANT CHANGE UP (ref 2–14)
NEUTROPHILS # BLD AUTO: 5.74 K/UL — SIGNIFICANT CHANGE UP (ref 1.8–7.4)
NEUTROPHILS NFR BLD AUTO: 55.5 % — SIGNIFICANT CHANGE UP (ref 43–77)
PLATELET # BLD AUTO: 389 K/UL — SIGNIFICANT CHANGE UP (ref 150–400)
RBC # BLD: 5.03 M/UL — SIGNIFICANT CHANGE UP (ref 3.8–5.2)
RBC # FLD: 13.9 % — SIGNIFICANT CHANGE UP (ref 10.3–14.5)
RH IG SCN BLD-IMP: POSITIVE — SIGNIFICANT CHANGE UP
WBC # BLD: 10.33 K/UL — SIGNIFICANT CHANGE UP (ref 3.8–10.5)
WBC # FLD AUTO: 10.33 K/UL — SIGNIFICANT CHANGE UP (ref 3.8–10.5)

## 2025-01-10 NOTE — H&P PST ADULT - NSICDXPASTSURGICALHX_GEN_ALL_CORE_FT
PAST SURGICAL HISTORY:  H/O dilation and curettage     S/P  section 10/26/2019    Termination of pregnancy (fetus)

## 2025-01-10 NOTE — H&P PST ADULT - RESPIRATORY AND THORAX COMMENTS
hx asthma - last used inhaler about a week ago with sinus infection, never hospitalized related to asthma

## 2025-01-10 NOTE — H&P PST ADULT - HISTORY OF PRESENT ILLNESS
34 year old female with hx c-sections scar ectopic x2 (most recently in 9/2024 requiring DVC) and also uterine fibroid presents today for presurgical evaluation for ... 34 year old female with hx c-sections scar ectopic x2 (most recently in 2024 requiring DVC) and also uterine fibroid presents today for presurgical evaluation for Robotic Myomectomy,  Scar Repair and Diagnostic Hysteroscopy Olympus Resectoscope on Standby.

## 2025-01-10 NOTE — H&P PST ADULT - CONSTITUTIONAL
Do Not Resuscitate (DNR)/Medical Orders for Life-Sustaining Treatment (MOLST)
normal/well-groomed/no distress

## 2025-01-10 NOTE — H&P PST ADULT - PROBLEM SELECTOR PLAN 1
Pre-op instructions provided. Pt verbalized understanding.   Pepcid provided for GI prophylaxis.   Pt given detailed verbal and written instructions on chlorhexidine wash. Pt verbalized understanding with teachback.   CBC with reflex, HCG, T&S done at PST.

## 2025-01-10 NOTE — H&P PST ADULT - NSICDXFAMILYHX_GEN_ALL_CORE_FT
FAMILY HISTORY:  Father  Still living? Unknown  Family history of diabetes mellitus (DM), Age at diagnosis: Age Unknown  FH: HTN (hypertension), Age at diagnosis: Age Unknown  FH: hyperlipidemia, Age at diagnosis: Age Unknown    Mother  Still living? Unknown  FH: HTN (hypertension), Age at diagnosis: Age Unknown

## 2025-01-10 NOTE — H&P PST ADULT - ENMT COMMENTS
Pt denies any loose teeth or dentures. Pt  had sinus infection and was treated with antibx/prednisone which she completed a week ago, denies any symptoms currently Mallampati II

## 2025-01-10 NOTE — H&P PST ADULT - NS PRO LAST MENSTRUAL DATE
PRIOR AUTHORIZATION DENIED    Medication: voltaren gel  denied     Denial Date: 7/26/2018    Denial Rational: must have osteoarthritis in joints of knees, feet, ankles, elbows, wrists, or hands:        Appeal Information: IF YOU WOULD LIKE TO APPEAL, PLEASE SUPPLY PA TEAM WITH A LETTER OF MEDICAL NECESSITY.              12/9/24

## 2025-01-11 PROBLEM — J45.998 OTHER ASTHMA: Chronic | Status: INACTIVE | Noted: 2020-12-22 | Resolved: 2025-01-10

## 2025-01-16 NOTE — ASU PATIENT PROFILE, ADULT - PATIENT'S SEXUAL ORIENTATION
Called the lab because I noticed the IgA level was normal and the result had finalized without running the TTG-IGA. Lab stated that since IgA level normal, they reflexively do not run the TTG-IGA. Discussed that I am unable to rule out celiac disease without this lab. They advised that I reorder that lab specifically and should return in 1-2 days. Will call the family with results when the lab returns.     Valery Esparza D.O.     Heterosexual

## 2025-01-17 ENCOUNTER — TRANSCRIPTION ENCOUNTER (OUTPATIENT)
Age: 35
End: 2025-01-17

## 2025-01-17 ENCOUNTER — APPOINTMENT (OUTPATIENT)
Dept: OBGYN | Facility: HOSPITAL | Age: 35
End: 2025-01-17

## 2025-01-17 ENCOUNTER — OUTPATIENT (OUTPATIENT)
Dept: OUTPATIENT SERVICES | Facility: HOSPITAL | Age: 35
LOS: 1 days | Discharge: HOME | End: 2025-01-17
Payer: COMMERCIAL

## 2025-01-17 VITALS
DIASTOLIC BLOOD PRESSURE: 84 MMHG | WEIGHT: 166.01 LBS | SYSTOLIC BLOOD PRESSURE: 122 MMHG | HEART RATE: 71 BPM | RESPIRATION RATE: 18 BRPM | TEMPERATURE: 99 F | OXYGEN SATURATION: 99 % | HEIGHT: 63 IN

## 2025-01-17 VITALS
RESPIRATION RATE: 17 BRPM | HEART RATE: 80 BPM | DIASTOLIC BLOOD PRESSURE: 66 MMHG | OXYGEN SATURATION: 100 % | SYSTOLIC BLOOD PRESSURE: 109 MMHG

## 2025-01-17 DIAGNOSIS — Z98.891 HISTORY OF UTERINE SCAR FROM PREVIOUS SURGERY: Chronic | ICD-10-CM

## 2025-01-17 DIAGNOSIS — Z98.890 OTHER SPECIFIED POSTPROCEDURAL STATES: Chronic | ICD-10-CM

## 2025-01-17 DIAGNOSIS — Z33.2 ENCOUNTER FOR ELECTIVE TERMINATION OF PREGNANCY: Chronic | ICD-10-CM

## 2025-01-17 DIAGNOSIS — N85.A ISTHMOCELE: ICD-10-CM

## 2025-01-17 LAB — HCG UR QL: NEGATIVE — SIGNIFICANT CHANGE UP

## 2025-01-17 PROCEDURE — 58545 LAPAROSCOPIC MYOMECTOMY: CPT

## 2025-01-17 PROCEDURE — 88305 TISSUE EXAM BY PATHOLOGIST: CPT | Mod: 26

## 2025-01-17 PROCEDURE — 58545 LAPAROSCOPIC MYOMECTOMY: CPT | Mod: AS

## 2025-01-17 PROCEDURE — 88304 TISSUE EXAM BY PATHOLOGIST: CPT | Mod: 26

## 2025-01-17 DEVICE — COSEAL 8ML: Type: IMPLANTABLE DEVICE | Status: FUNCTIONAL

## 2025-01-17 RX ORDER — OXYCODONE 5 MG/1
5 TABLET ORAL EVERY 4 HOURS
Qty: 5 | Refills: 0 | Status: ACTIVE | COMMUNITY
Start: 2025-01-17 | End: 1900-01-01

## 2025-01-17 RX ORDER — HYDROMORPHONE HCL 4 MG
0.5 TABLET ORAL
Refills: 0 | Status: DISCONTINUED | OUTPATIENT
Start: 2025-01-17 | End: 2025-01-17

## 2025-01-17 RX ORDER — ALBUTEROL SULFATE 90 UG/1
2 INHALANT RESPIRATORY (INHALATION)
Refills: 0 | DISCHARGE

## 2025-01-17 RX ORDER — SODIUM CHLORIDE 9 MG/ML
1000 INJECTION, SOLUTION INTRAVENOUS
Refills: 0 | Status: DISCONTINUED | OUTPATIENT
Start: 2025-01-17 | End: 2025-01-17

## 2025-01-17 RX ORDER — SODIUM CHLORIDE 9 MG/ML
3 INJECTION, SOLUTION INTRAMUSCULAR; INTRAVENOUS; SUBCUTANEOUS EVERY 8 HOURS
Refills: 0 | Status: DISCONTINUED | OUTPATIENT
Start: 2025-01-17 | End: 2025-01-17

## 2025-01-17 RX ORDER — ONDANSETRON 4 MG/1
4 TABLET ORAL
Refills: 0 | Status: ACTIVE | OUTPATIENT
Start: 2025-01-17 | End: 2025-12-16

## 2025-01-17 RX ADMIN — Medication 0.5 MILLIGRAM(S): at 11:15

## 2025-01-17 RX ADMIN — SODIUM CHLORIDE 30 MILLILITER(S): 9 INJECTION, SOLUTION INTRAVENOUS at 06:17

## 2025-01-17 RX ADMIN — Medication 0.5 MILLIGRAM(S): at 10:50

## 2025-01-17 RX ADMIN — Medication 0.5 MILLIGRAM(S): at 11:41

## 2025-01-17 RX ADMIN — ONDANSETRON 4 MILLIGRAM(S): 4 TABLET ORAL at 14:10

## 2025-01-17 RX ADMIN — Medication 0.5 MILLIGRAM(S): at 11:20

## 2025-01-17 NOTE — ASU DISCHARGE PLAN (ADULT/PEDIATRIC) - FOLLOW UP APPOINTMENTS
475 may also call Recovery Room (PACU) 24/7 @ [phone]/[facility]

## 2025-01-17 NOTE — ASU DISCHARGE PLAN (ADULT/PEDIATRIC) - CALL YOUR DOCTOR IF YOU HAVE ANY OF THE FOLLOWING:
Bleeding that does not stop/Fever greater than (need to indicate Fahrenheit or Celsius)/Wound/Surgical Site with redness, or foul smelling discharge or pus Methotrexate Counseling:  Patient counseled regarding adverse effects of methotrexate including but not limited to nausea, vomiting, abnormalities in liver function tests. Patients may develop mouth sores, rash, diarrhea, and abnormalities in blood counts. The patient understands that monitoring is required including LFT's and blood counts.  There is a rare possibility of scarring of the liver and lung problems that can occur when taking methotrexate. Persistent nausea, loss of appetite, pale stools, dark urine, cough, and shortness of breath should be reported immediately. Patient advised to discontinue methotrexate treatment at least three months before attempting to become pregnant.  I discussed the need for folate supplements while taking methotrexate.  These supplements can decrease side effects during methotrexate treatment. The patient verbalized understanding of the proper use and possible adverse effects of methotrexate.  All of the patient's questions and concerns were addressed.

## 2025-01-17 NOTE — ASU DISCHARGE PLAN (ADULT/PEDIATRIC) - CARE PROVIDER_API CALL
Aram Acosta  Obstetrics and Gynecology  [address] [place] [address]  Phone: [phone]  Fax: [phone]  Follow Up Time: 2 weeks

## 2025-01-17 NOTE — ASU DISCHARGE PLAN (ADULT/PEDIATRIC) - FINANCIAL ASSISTANCE
[facility] provides services at a reduced cost to those who are determined to be eligible through [facility]’s financial assistance program. Information regarding [facility]’s financial assistance program can be found by going to [email or web address] or by calling [phone].

## 2025-01-17 NOTE — BRIEF OPERATIVE NOTE - OPERATION/FINDINGS
fibroid of uterus fibroid of uterus w/ three fibroids - 1.5 anterior NIGEL SS myoma, 1 cm IM left fundal myoma, 1 cm SS right fundal myoma

## 2025-01-17 NOTE — BRIEF OPERATIVE NOTE - NSICDXBRIEFPROCEDURE_GEN_ALL_CORE_FT
PROCEDURES:  Myomectomy, robot-assisted, laparoscopic, 1-4 myomas 17-Jan-2025 10:27:59  Anuradha Welch J

## 2025-01-17 NOTE — BRIEF OPERATIVE NOTE - COMMENTS
I, Anuradha Welch PA-C, served as the first assistant in this operation. I assisted in placing ports, docking, and targeting the da Sonu robot, first assisted at the surgical field while the surgeon was performing the operation at the robotic console by providing instrument exchanges, tissue retraction, suction and irrigation, specimen retrieval, passing and removing sutures and sponges, undocking the robotic platform, and closed surgical wounds.

## 2025-01-17 NOTE — ASU DISCHARGE PLAN (ADULT/PEDIATRIC) - NURSING INSTRUCTIONS
Follow up with MD. Continue all medications as prescribed. When taking pain meds - take with food and know it may cause constipation and nausea - Do NOT drive while on narcotics. Drink fluids throughout the day. When showering, do not scrub at incision site, do not apply lotions or powder. Do not submerge incision in water. Follow up with MD. Continue all medications as prescribed. When taking pain meds - take with food and know it may cause constipation and nausea - Do NOT drive while on narcotics. You received IV Tylenol for pain management. Please DO NOT take any Tylenol (Acetaminophen) containing products, such as Vicodin, Percocet, Excedrin, and cold medications for the next 6 hours (until 3:45PM). DO NOT TAKE MORE THAN 3000 MG OF TYLENOL in a 24 hour period.  Drink fluids throughout the day. You received IV Toradol for pain management. Please DO NOT take Motrin/Ibuprofen/Advil/Aleve/NSAIDs (Non-Steroidal Anti-Inflammatory Drugs) for the next 6 hours (until 3:45PM). When showering, do not scrub at incision site, do not apply lotions or powder. Do not submerge incision in water. Follow up with MD. Continue all medications as prescribed. As per team, narcotic sent to community pharmacy. See instructions. When taking pain meds - take with food and know it may cause constipation and nausea - Do NOT drive while on narcotics. You received IV Tylenol for pain management. Please DO NOT take any Tylenol (Acetaminophen) containing products, such as Vicodin, Percocet, Excedrin, and cold medications for the next 6 hours (until 3:45PM). DO NOT TAKE MORE THAN 3000 MG OF TYLENOL in a 24 hour period.  Drink fluids throughout the day. You received IV Toradol for pain management. Please DO NOT take Motrin/Ibuprofen/Advil/Aleve/NSAIDs (Non-Steroidal Anti-Inflammatory Drugs) for the next 6 hours (until 3:45PM). When showering, do not scrub at incision site, do not apply lotions or powder. Do not submerge incision in water.

## 2025-01-28 LAB — SURGICAL PATHOLOGY STUDY: SIGNIFICANT CHANGE UP

## 2025-01-30 ENCOUNTER — APPOINTMENT (OUTPATIENT)
Dept: OBGYN | Facility: CLINIC | Age: 35
End: 2025-01-30
Payer: COMMERCIAL

## 2025-01-30 VITALS
WEIGHT: 168 LBS | HEIGHT: 64 IN | SYSTOLIC BLOOD PRESSURE: 109 MMHG | HEART RATE: 83 BPM | DIASTOLIC BLOOD PRESSURE: 77 MMHG | BODY MASS INDEX: 28.68 KG/M2

## 2025-01-30 DIAGNOSIS — D25.9 LEIOMYOMA OF UTERUS, UNSPECIFIED: ICD-10-CM

## 2025-01-30 DIAGNOSIS — N85.A ISTHMOCELE: ICD-10-CM

## 2025-01-30 PROCEDURE — 99024 POSTOP FOLLOW-UP VISIT: CPT

## 2025-04-02 PROBLEM — Z34.02 ENCOUNTER FOR PRENATAL CARE IN SECOND TRIMESTER OF FIRST PREGNANCY: Status: RESOLVED | Noted: 2019-07-22 | Resolved: 2025-04-02

## 2025-05-19 NOTE — ED PROVIDER NOTE - CPE EDP ENMT NORM
May 19, 2025     Patient: Cinthia Maxwell   YOB: 2014   Date of Visit: 5/19/2025       To Whom it May Concern:    Cinthia Maxwell was seen in my clinic on 5/19/2025 at 9:00 am.     Please excuse Cinthia for her absence from school on the date listed above to be able to make her appointment. She cn return to school on 05/20/2025      Sincerely,         Ginger Silvestre MD    Medical information is confidential and cannot be disclosed without the written consent of the patient or her representative.    
normal...

## 2025-05-21 ENCOUNTER — NON-APPOINTMENT (OUTPATIENT)
Age: 35
End: 2025-05-21

## 2025-05-21 DIAGNOSIS — N85.A ISTHMOCELE: ICD-10-CM

## 2025-05-21 DIAGNOSIS — D25.9 LEIOMYOMA OF UTERUS, UNSPECIFIED: ICD-10-CM

## 2025-05-28 ENCOUNTER — ASOB RESULT (OUTPATIENT)
Age: 35
End: 2025-05-28

## 2025-05-28 ENCOUNTER — APPOINTMENT (OUTPATIENT)
Dept: OBGYN | Facility: CLINIC | Age: 35
End: 2025-05-28
Payer: COMMERCIAL

## 2025-05-28 PROCEDURE — 76830 TRANSVAGINAL US NON-OB: CPT

## 2025-05-29 ENCOUNTER — NON-APPOINTMENT (OUTPATIENT)
Age: 35
End: 2025-05-29

## 2025-05-29 DIAGNOSIS — O36.80X0 PREGNANCY WITH INCONCLUSIVE FETAL VIABILITY, NOT APPLICABLE OR UNSPECIFIED: ICD-10-CM

## 2025-05-30 ENCOUNTER — TRANSCRIPTION ENCOUNTER (OUTPATIENT)
Age: 35
End: 2025-05-30

## 2025-05-30 ENCOUNTER — APPOINTMENT (OUTPATIENT)
Dept: OBGYN | Facility: CLINIC | Age: 35
End: 2025-05-30

## 2025-06-02 LAB — HCG SERPL-MCNC: ABNORMAL MIU/ML

## 2025-06-03 ENCOUNTER — APPOINTMENT (OUTPATIENT)
Dept: OBGYN | Facility: CLINIC | Age: 35
End: 2025-06-03
Payer: COMMERCIAL

## 2025-06-03 ENCOUNTER — ASOB RESULT (OUTPATIENT)
Age: 35
End: 2025-06-03

## 2025-06-03 PROCEDURE — 76830 TRANSVAGINAL US NON-OB: CPT

## 2025-06-04 ENCOUNTER — NON-APPOINTMENT (OUTPATIENT)
Age: 35
End: 2025-06-04

## 2025-06-04 LAB — HCG SERPL-MCNC: ABNORMAL MIU/ML

## 2025-06-05 ENCOUNTER — APPOINTMENT (OUTPATIENT)
Dept: OBGYN | Facility: CLINIC | Age: 35
End: 2025-06-05

## 2025-06-09 ENCOUNTER — APPOINTMENT (OUTPATIENT)
Dept: OBGYN | Facility: CLINIC | Age: 35
End: 2025-06-09
Payer: COMMERCIAL

## 2025-06-09 ENCOUNTER — ASOB RESULT (OUTPATIENT)
Age: 35
End: 2025-06-09

## 2025-06-09 PROCEDURE — 76817 TRANSVAGINAL US OBSTETRIC: CPT

## 2025-06-11 ENCOUNTER — NON-APPOINTMENT (OUTPATIENT)
Age: 35
End: 2025-06-11

## 2025-06-18 ENCOUNTER — NON-APPOINTMENT (OUTPATIENT)
Age: 35
End: 2025-06-18

## 2025-06-19 ENCOUNTER — NON-APPOINTMENT (OUTPATIENT)
Age: 35
End: 2025-06-19

## 2025-06-20 ENCOUNTER — TRANSCRIPTION ENCOUNTER (OUTPATIENT)
Age: 35
End: 2025-06-20

## 2025-06-25 ENCOUNTER — APPOINTMENT (OUTPATIENT)
Dept: ANTEPARTUM | Facility: CLINIC | Age: 35
End: 2025-06-25

## 2025-06-25 ENCOUNTER — ASOB RESULT (OUTPATIENT)
Age: 35
End: 2025-06-25

## 2025-06-25 PROCEDURE — 76817 TRANSVAGINAL US OBSTETRIC: CPT

## 2025-06-25 PROCEDURE — 76801 OB US < 14 WKS SINGLE FETUS: CPT

## 2025-06-25 PROCEDURE — 99214 OFFICE O/P EST MOD 30 MIN: CPT | Mod: 25

## 2025-06-26 ENCOUNTER — APPOINTMENT (OUTPATIENT)
Dept: OBGYN | Facility: CLINIC | Age: 35
End: 2025-06-26

## 2025-06-26 ENCOUNTER — NON-APPOINTMENT (OUTPATIENT)
Age: 35
End: 2025-06-26

## 2025-07-09 ENCOUNTER — APPOINTMENT (OUTPATIENT)
Dept: OBGYN | Facility: CLINIC | Age: 35
End: 2025-07-09
Payer: COMMERCIAL

## 2025-07-09 ENCOUNTER — ASOB RESULT (OUTPATIENT)
Age: 35
End: 2025-07-09

## 2025-07-09 ENCOUNTER — NON-APPOINTMENT (OUTPATIENT)
Age: 35
End: 2025-07-09

## 2025-07-09 VITALS
SYSTOLIC BLOOD PRESSURE: 118 MMHG | WEIGHT: 167.19 LBS | HEART RATE: 98 BPM | BODY MASS INDEX: 29.62 KG/M2 | HEIGHT: 63 IN | DIASTOLIC BLOOD PRESSURE: 81 MMHG

## 2025-07-09 PROBLEM — Z34.91 ENCOUNTER FOR PREGNANCY RELATED EXAMINATION IN FIRST TRIMESTER: Status: ACTIVE | Noted: 2025-07-09

## 2025-07-09 PROCEDURE — 76817 TRANSVAGINAL US OBSTETRIC: CPT

## 2025-07-09 PROCEDURE — 0500F INITIAL PRENATAL CARE VISIT: CPT

## 2025-07-09 RX ORDER — PYRIDOXINE HCL (VITAMIN B6) 25 MG
25 TABLET ORAL
Qty: 90 | Refills: 0 | Status: ACTIVE | COMMUNITY
Start: 2025-07-09 | End: 1900-01-01

## 2025-07-09 RX ORDER — PRENATAL 168/IRON/FOLIC/OMEGA3 27-800-235
28-0.8-235 CAPSULE ORAL
Qty: 30 | Refills: 2 | Status: ACTIVE | COMMUNITY
Start: 2025-07-09 | End: 1900-01-01

## 2025-07-10 LAB
ABORH: NORMAL
ALBUMIN SERPL ELPH-MCNC: 4.3 G/DL
ALP BLD-CCNC: 68 U/L
ALT SERPL-CCNC: 13 U/L
ANION GAP SERPL CALC-SCNC: 15 MMOL/L
ANTIBODY SCREEN: NORMAL
AST SERPL-CCNC: 13 U/L
BASOPHILS # BLD AUTO: 0.03 K/UL
BASOPHILS NFR BLD AUTO: 0.3 %
BILIRUB SERPL-MCNC: 0.2 MG/DL
BUN SERPL-MCNC: 7 MG/DL
C TRACH RRNA SPEC QL NAA+PROBE: NOT DETECTED
CALCIUM SERPL-MCNC: 10.1 MG/DL
CHLORIDE SERPL-SCNC: 100 MMOL/L
CO2 SERPL-SCNC: 19 MMOL/L
CREAT SERPL-MCNC: 0.41 MG/DL
EGFRCR SERPLBLD CKD-EPI 2021: 131 ML/MIN/1.73M2
EOSINOPHIL # BLD AUTO: 0.15 K/UL
EOSINOPHIL NFR BLD AUTO: 1.4 %
ESTIMATED AVERAGE GLUCOSE: 100 MG/DL
GLUCOSE SERPL-MCNC: 80 MG/DL
HBA1C MFR BLD HPLC: 5.1 %
HBV SURFACE AG SER QL: NONREACTIVE
HCT VFR BLD CALC: 39.5 %
HCV AB SER QL: NONREACTIVE
HCV S/CO RATIO: 0.1 S/CO
HGB BLD-MCNC: 13.1 G/DL
HIV1+2 AB SPEC QL IA.RAPID: NONREACTIVE
IMM GRANULOCYTES NFR BLD AUTO: 0.3 %
LYMPHOCYTES # BLD AUTO: 2.44 K/UL
LYMPHOCYTES NFR BLD AUTO: 22.7 %
MAN DIFF?: NORMAL
MCHC RBC-ENTMCNC: 28.2 PG
MCHC RBC-ENTMCNC: 33.2 G/DL
MCV RBC AUTO: 85.1 FL
MONOCYTES # BLD AUTO: 0.68 K/UL
MONOCYTES NFR BLD AUTO: 6.3 %
N GONORRHOEA RRNA SPEC QL NAA+PROBE: NOT DETECTED
NEUTROPHILS # BLD AUTO: 7.4 K/UL
NEUTROPHILS NFR BLD AUTO: 69 %
PLATELET # BLD AUTO: 347 K/UL
POTASSIUM SERPL-SCNC: 4.1 MMOL/L
PROT SERPL-MCNC: 7.3 G/DL
RBC # BLD: 4.64 M/UL
RBC # FLD: 14.4 %
SODIUM SERPL-SCNC: 134 MMOL/L
SOURCE AMPLIFICATION: NORMAL
TSH SERPL-ACNC: 1.65 UIU/ML
WBC # FLD AUTO: 10.73 K/UL

## 2025-07-11 LAB
B19V IGG SER QL IA: 5.13 INDEX
B19V IGG+IGM SER-IMP: NORMAL
B19V IGG+IGM SER-IMP: POSITIVE
B19V IGM FLD-ACNC: 0.21 INDEX
B19V IGM SER-ACNC: NEGATIVE
BACTERIA UR CULT: NORMAL
LEAD BLD-MCNC: <1 UG/DL

## 2025-07-14 LAB
CMV IGG SERPL QL: 3.1 U/ML
CMV IGG SERPL-IMP: POSITIVE
CMV IGM SERPL QL: 8.57 AU/ML
CMV IGM SERPL QL: NEGATIVE
M TB IFN-G BLD-IMP: NEGATIVE
MEV IGG FLD QL IA: 5.2 AU/ML
MEV IGG+IGM SER-IMP: NEGATIVE
QUANTIFERON TB PLUS MITOGEN MINUS NIL: >10 IU/ML
QUANTIFERON TB PLUS NIL: 0.02 IU/ML
QUANTIFERON TB PLUS TB1 MINUS NIL: 0 IU/ML
QUANTIFERON TB PLUS TB2 MINUS NIL: 0 IU/ML
RUBV IGG FLD-ACNC: 1.24 INDEX
RUBV IGG SER-IMP: POSITIVE
T GONDII AB SER-IMP: NEGATIVE
T GONDII AB SER-IMP: NEGATIVE
T GONDII IGG SER QL: <3 IU/ML
T GONDII IGM SER QL: <3 AU/ML
T PALLIDUM AB SER QL IA: NEGATIVE
VZV AB TITR SER: NEGATIVE
VZV IGG SER IF-ACNC: 0.32 S/CO

## 2025-07-18 ENCOUNTER — APPOINTMENT (OUTPATIENT)
Dept: ANTEPARTUM | Facility: CLINIC | Age: 35
End: 2025-07-18
Payer: COMMERCIAL

## 2025-07-18 ENCOUNTER — ASOB RESULT (OUTPATIENT)
Age: 35
End: 2025-07-18

## 2025-07-18 PROCEDURE — 99214 OFFICE O/P EST MOD 30 MIN: CPT | Mod: 25

## 2025-08-06 ENCOUNTER — APPOINTMENT (OUTPATIENT)
Dept: OBGYN | Facility: CLINIC | Age: 35
End: 2025-08-06
Payer: COMMERCIAL

## 2025-08-06 ENCOUNTER — NON-APPOINTMENT (OUTPATIENT)
Age: 35
End: 2025-08-06

## 2025-08-06 VITALS
DIASTOLIC BLOOD PRESSURE: 73 MMHG | HEIGHT: 63 IN | HEART RATE: 98 BPM | SYSTOLIC BLOOD PRESSURE: 109 MMHG | WEIGHT: 170 LBS | BODY MASS INDEX: 30.12 KG/M2

## 2025-08-06 DIAGNOSIS — Z34.92 ENCOUNTER FOR SUPERVISION OF NORMAL PREGNANCY, UNSPECIFIED, SECOND TRIMESTER: ICD-10-CM

## 2025-08-06 PROCEDURE — 36415 COLL VENOUS BLD VENIPUNCTURE: CPT

## 2025-08-06 PROCEDURE — 0500F INITIAL PRENATAL CARE VISIT: CPT

## 2025-08-07 LAB
2ND TRIMESTER 4 SCREEN SERPL-IMP: NO
AFP ADJ MOM SERPL: 0.98
AFP INTERP SERPL-IMP: NORMAL
AFP INTERP SERPL-IMP: NORMAL
AFP MOM CUT-OFF: 2.5
AFP PERCENTILE: 47.5
AFP SERPL-ACNC: 27.24 NG/ML
CARBAMAZEPINE?: NO
CURRENT SMOKER: NORMAL
DIABETES STATUS PATIENT: NORMAL
GA: NORMAL
GESTATIONAL AGE METHOD: NORMAL
HX OF NTD NARR: NORMAL
MULTIPLE PREGNANCY: NORMAL
NEURAL TUBE DEFECT RISK FETUS: NORMAL
NEURAL TUBE DEFECT RISK POP: NORMAL
TEST PERFORMANCE INFO SPEC: NORMAL
VALPROIC ACID?: NORMAL

## 2025-08-14 ENCOUNTER — APPOINTMENT (OUTPATIENT)
Dept: ANTEPARTUM | Facility: CLINIC | Age: 35
End: 2025-08-14

## 2025-08-14 ENCOUNTER — ASOB RESULT (OUTPATIENT)
Age: 35
End: 2025-08-14

## 2025-08-14 PROCEDURE — 76805 OB US >/= 14 WKS SNGL FETUS: CPT

## 2025-08-14 PROCEDURE — 76817 TRANSVAGINAL US OBSTETRIC: CPT

## 2025-09-05 ENCOUNTER — APPOINTMENT (OUTPATIENT)
Dept: OBGYN | Facility: CLINIC | Age: 35
End: 2025-09-05
Payer: COMMERCIAL

## 2025-09-05 VITALS
WEIGHT: 174 LBS | DIASTOLIC BLOOD PRESSURE: 82 MMHG | HEIGHT: 63 IN | SYSTOLIC BLOOD PRESSURE: 123 MMHG | BODY MASS INDEX: 30.83 KG/M2 | HEART RATE: 116 BPM

## 2025-09-05 PROCEDURE — 0502F SUBSEQUENT PRENATAL CARE: CPT

## 2025-09-19 ENCOUNTER — APPOINTMENT (OUTPATIENT)
Dept: ANTEPARTUM | Facility: CLINIC | Age: 35
End: 2025-09-19
Payer: COMMERCIAL

## 2025-09-19 ENCOUNTER — ASOB RESULT (OUTPATIENT)
Age: 35
End: 2025-09-19

## 2025-09-19 PROCEDURE — 76817 TRANSVAGINAL US OBSTETRIC: CPT

## 2025-09-19 PROCEDURE — 76811 OB US DETAILED SNGL FETUS: CPT

## 2025-09-19 PROCEDURE — 99213 OFFICE O/P EST LOW 20 MIN: CPT | Mod: 25

## 2025-09-22 PROBLEM — O00.90 CESAREAN SCAR ECTOPIC PREGNANCY: Status: RESOLVED | Noted: 2024-09-12 | Resolved: 2025-09-22

## 2025-09-22 PROBLEM — O00.90 CESAREAN SCAR ECTOPIC PREGNANCY: Status: ACTIVE | Noted: 2025-09-22

## 2025-09-22 PROBLEM — O34.29 PREGNANCY W/ HX OF UTERINE MYOMECTOMY: Status: ACTIVE | Noted: 2025-09-22

## 2025-09-22 PROBLEM — Z98.890 H/O: MYOMECTOMY: Status: ACTIVE | Noted: 2025-09-22

## (undated) DEVICE — POSITIONER PINK PAD PIGAZZI SYSTEM

## (undated) DEVICE — BASIN SET DOUBLE

## (undated) DEVICE — GOWN LG

## (undated) DEVICE — PACK ROBOTIC LIJ

## (undated) DEVICE — SYR LUER LOK 20CC

## (undated) DEVICE — XI SCISSOR TIP COVER

## (undated) DEVICE — PRESSURE INFUSOR BAG 1000ML

## (undated) DEVICE — POSITIONER STRAP ARMBOARD VELCRO TS-30

## (undated) DEVICE — PREP BETADINE KIT

## (undated) DEVICE — TUBING STRYKEFLOW II SUCTION / IRRIGATOR

## (undated) DEVICE — DRAPE UNDER BUTTOCKS W SCREEN

## (undated) DEVICE — DRSG TELFA 3 X 8

## (undated) DEVICE — ELCTR BOVIE PENCIL SMOKE EVACUATION

## (undated) DEVICE — TUBING IRR SET FOR CYSTOSCOPY 77"

## (undated) DEVICE — TUBING SUCTION NONCONDUCTIVE 6MM X 12FT

## (undated) DEVICE — XI SEAL UNIVERSIAL 5-12MM

## (undated) DEVICE — VENODYNE/SCD SLEEVE CALF MEDIUM

## (undated) DEVICE — POSITIONER PURPLE ARM ONE STEP (LARGE)

## (undated) DEVICE — SYR LUER LOK 10CC

## (undated) DEVICE — XI DRAPE ARM

## (undated) DEVICE — XI ARM FORCEP PROGRASP 8MM

## (undated) DEVICE — CABLE DAC ACTIVE CORD

## (undated) DEVICE — DRAPE IRRIGATION POUCH 19X23"

## (undated) DEVICE — DRAPE LIGHT HANDLE COVER (GREEN)

## (undated) DEVICE — GLV 7.5 PROTEXIS (WHITE)

## (undated) DEVICE — DRAPE TOWEL BLUE 17" X 24"

## (undated) DEVICE — ENDOCATCH 10MM

## (undated) DEVICE — ELCTR CUTTING LOOP RIGHT ANGLE 24FR

## (undated) DEVICE — SOL IRR POUR NS 0.9% 1000ML

## (undated) DEVICE — INSUFFLATION NDL ETHICON ENDOPATH VERESS 14G 120MM

## (undated) DEVICE — PACK D&C

## (undated) DEVICE — DRAPE WARMING SOLUTION 44 X 44"

## (undated) DEVICE — GLV 6.5 PROTEXIS (CREAM) MICRO

## (undated) DEVICE — SUT VICRYL 0 27" UR-6

## (undated) DEVICE — DRSG PAD SANITARY OB

## (undated) DEVICE — ELCTR REM POLYHESIVE ADULT PT RETURN 15FT

## (undated) DEVICE — DRSG OPSITE 13.75 X 4"

## (undated) DEVICE — FLUENT FMS PROCEDURE KIT

## (undated) DEVICE — PACK PERI GYN

## (undated) DEVICE — GOWN XL

## (undated) DEVICE — SUT VICRYL PLUS 0 27" CT-2 UNDYED

## (undated) DEVICE — UTERINE MANIPULATOR CONMED VCARE MED 34MM

## (undated) DEVICE — UTERINE MANIPULATOR COOPER SURGICAL 5MM 33CM GREEN

## (undated) DEVICE — XI DRAPE COLUMN

## (undated) DEVICE — SYMPHION FLUID MANAGEMENT DEVICE

## (undated) DEVICE — SYMPHION 3.6MM RESECTING DEVICE

## (undated) DEVICE — LABELS BLANK W PEN

## (undated) DEVICE — TROCAR SURGIQUEST AIRSEAL 5MM X 100MM

## (undated) DEVICE — XI OBTURATOR OPTICAL BLADELESS 8MM

## (undated) DEVICE — POSITIONER FOAM HEAD CRADLE (PINK)

## (undated) DEVICE — TUBING AIRSEAL TRI-LUMEN FILTERED

## (undated) DEVICE — TRAP SPECIMEN SPUTUM 40CC

## (undated) DEVICE — SOL IRR POUR H2O 500ML

## (undated) DEVICE — DRAPE GYN IRRIGATION POUCH 19 X 23"

## (undated) DEVICE — XI ARM FORCEP TENACULUM

## (undated) DEVICE — NDL SPINAL 20G X 3.5" (YELLOW)

## (undated) DEVICE — TUBING STRYKER HYSTEROSCOPY INFLOW OUTFLOW

## (undated) DEVICE — XI ARM SCISSOR MONO CURVED

## (undated) DEVICE — SYR LUER LOK 50CC

## (undated) DEVICE — XI ARM FORCEP MARYLAND BIPOLAR

## (undated) DEVICE — XI ARM NEEDLE DRIVER LARGE

## (undated) DEVICE — FOLEY TRAY 16FR 5CC LF UMETER CLOSED

## (undated) DEVICE — SUT MONOCRYL 4-0 27" PS-2 UNDYED

## (undated) DEVICE — VISITEC 4X4

## (undated) DEVICE — D HELP - CLEARVIEW CLEARIFY SYSTEM

## (undated) DEVICE — XI ARM NEEDLE DRIVER MEGA

## (undated) DEVICE — GLV 8 PROTEXIS (BLUE)

## (undated) DEVICE — ELCTR PLASMA BAND MEDIUM 24FR 12-30 DEG

## (undated) DEVICE — SI OBTURATOR BLADELESS 8MM

## (undated) DEVICE — WARMING BLANKET FULL ADULT

## (undated) DEVICE — ELCTR BOVIE TIP BLADE INSULATED 2.75" EDGE

## (undated) DEVICE — SOL IRR BAG NS 0.9% 1000ML